# Patient Record
Sex: MALE | Race: OTHER | Employment: FULL TIME | URBAN - METROPOLITAN AREA
[De-identification: names, ages, dates, MRNs, and addresses within clinical notes are randomized per-mention and may not be internally consistent; named-entity substitution may affect disease eponyms.]

---

## 2018-08-02 PROBLEM — G43.409 HEMIPLEGIC MIGRAINE: Status: ACTIVE | Noted: 2018-08-02

## 2018-08-02 PROBLEM — G45.9 TIA (TRANSIENT ISCHEMIC ATTACK): Status: ACTIVE | Noted: 2018-08-02

## 2018-08-02 PROBLEM — Z72.0 TOBACCO ABUSE: Chronic | Status: ACTIVE | Noted: 2018-08-02

## 2018-08-02 PROBLEM — I10 ESSENTIAL HYPERTENSION: Status: ACTIVE | Noted: 2018-08-02

## 2018-08-07 ENCOUNTER — HOSPITAL ENCOUNTER (INPATIENT)
Age: 63
LOS: 8 days | Discharge: HOME | DRG: 038 | End: 2018-08-15
Attending: EMERGENCY MEDICINE | Admitting: SURGERY
Payer: COMMERCIAL

## 2018-08-07 ENCOUNTER — APPOINTMENT (OUTPATIENT)
Dept: CT IMAGING | Age: 63
DRG: 038 | End: 2018-08-07
Attending: EMERGENCY MEDICINE
Payer: COMMERCIAL

## 2018-08-07 DIAGNOSIS — G56.03 BILATERAL CARPAL TUNNEL SYNDROME: ICD-10-CM

## 2018-08-07 DIAGNOSIS — I65.23 BILATERAL CAROTID ARTERY STENOSIS: ICD-10-CM

## 2018-08-07 DIAGNOSIS — G45.1 TRANSIENT ISCHEMIC ATTACK INVOLVING RIGHT INTERNAL CAROTID ARTERY: ICD-10-CM

## 2018-08-07 DIAGNOSIS — I63.239 CAROTID ARTERY STENOSIS WITH CEREBRAL INFARCTION (HCC): ICD-10-CM

## 2018-08-07 DIAGNOSIS — G45.9 TRANSIENT CEREBRAL ISCHEMIA, UNSPECIFIED TYPE: ICD-10-CM

## 2018-08-07 DIAGNOSIS — I63.9 CEREBROVASCULAR ACCIDENT (CVA), UNSPECIFIED MECHANISM (HCC): ICD-10-CM

## 2018-08-07 LAB
ALBUMIN SERPL-MCNC: 3.8 G/DL (ref 3.5–5)
ALBUMIN/GLOB SERPL: 0.9 {RATIO} (ref 1.1–2.2)
ALP SERPL-CCNC: 86 U/L (ref 45–117)
ALT SERPL-CCNC: 33 U/L (ref 12–78)
ANION GAP SERPL CALC-SCNC: 8 MMOL/L (ref 5–15)
APPEARANCE UR: CLEAR
AST SERPL-CCNC: 16 U/L (ref 15–37)
ATRIAL RATE: 58 BPM
BASOPHILS # BLD: 0.1 K/UL (ref 0–0.1)
BASOPHILS NFR BLD: 1 % (ref 0–1)
BILIRUB SERPL-MCNC: 1 MG/DL (ref 0.2–1)
BILIRUB UR QL: NEGATIVE
BUN SERPL-MCNC: 14 MG/DL (ref 6–20)
BUN/CREAT SERPL: 14 (ref 12–20)
CALCIUM SERPL-MCNC: 8.7 MG/DL (ref 8.5–10.1)
CALCULATED P AXIS, ECG09: 8 DEGREES
CALCULATED R AXIS, ECG10: -10 DEGREES
CALCULATED T AXIS, ECG11: 26 DEGREES
CHLORIDE SERPL-SCNC: 103 MMOL/L (ref 97–108)
CK MB CFR SERPL CALC: NORMAL % (ref 0–2.5)
CK MB SERPL-MCNC: <1 NG/ML (ref 5–25)
CK SERPL-CCNC: 60 U/L (ref 39–308)
CO2 SERPL-SCNC: 28 MMOL/L (ref 21–32)
COLOR UR: NORMAL
CREAT SERPL-MCNC: 0.99 MG/DL (ref 0.7–1.3)
DIAGNOSIS, 93000: NORMAL
DIFFERENTIAL METHOD BLD: ABNORMAL
EOSINOPHIL # BLD: 0.5 K/UL (ref 0–0.4)
EOSINOPHIL NFR BLD: 5 % (ref 0–7)
ERYTHROCYTE [DISTWIDTH] IN BLOOD BY AUTOMATED COUNT: 12.1 % (ref 11.5–14.5)
GLOBULIN SER CALC-MCNC: 4.2 G/DL (ref 2–4)
GLUCOSE BLD STRIP.AUTO-MCNC: 167 MG/DL (ref 65–100)
GLUCOSE BLD STRIP.AUTO-MCNC: 99 MG/DL (ref 65–100)
GLUCOSE SERPL-MCNC: 88 MG/DL (ref 65–100)
GLUCOSE UR STRIP.AUTO-MCNC: NEGATIVE MG/DL
HCT VFR BLD AUTO: 42.8 % (ref 36.6–50.3)
HGB BLD-MCNC: 14.6 G/DL (ref 12.1–17)
HGB UR QL STRIP: NEGATIVE
IMM GRANULOCYTES # BLD: 0 K/UL (ref 0–0.04)
IMM GRANULOCYTES NFR BLD AUTO: 0 % (ref 0–0.5)
INR BLD: 1 (ref 0.9–1.2)
INR PPP: 1 (ref 0.9–1.1)
KETONES UR QL STRIP.AUTO: NEGATIVE MG/DL
LEUKOCYTE ESTERASE UR QL STRIP.AUTO: NEGATIVE
LYMPHOCYTES # BLD: 2.1 K/UL (ref 0.8–3.5)
LYMPHOCYTES NFR BLD: 22 % (ref 12–49)
MAGNESIUM SERPL-MCNC: 2.1 MG/DL (ref 1.6–2.4)
MCH RBC QN AUTO: 31 PG (ref 26–34)
MCHC RBC AUTO-ENTMCNC: 34.1 G/DL (ref 30–36.5)
MCV RBC AUTO: 90.9 FL (ref 80–99)
MONOCYTES # BLD: 0.6 K/UL (ref 0–1)
MONOCYTES NFR BLD: 7 % (ref 5–13)
NEUTS SEG # BLD: 6.5 K/UL (ref 1.8–8)
NEUTS SEG NFR BLD: 66 % (ref 32–75)
NITRITE UR QL STRIP.AUTO: NEGATIVE
NRBC # BLD: 0 K/UL (ref 0–0.01)
NRBC BLD-RTO: 0 PER 100 WBC
P-R INTERVAL, ECG05: 170 MS
PH UR STRIP: 7 [PH] (ref 5–8)
PLATELET # BLD AUTO: 286 K/UL (ref 150–400)
PMV BLD AUTO: 10 FL (ref 8.9–12.9)
POTASSIUM SERPL-SCNC: 4.3 MMOL/L (ref 3.5–5.1)
PROT SERPL-MCNC: 8 G/DL (ref 6.4–8.2)
PROT UR STRIP-MCNC: NEGATIVE MG/DL
PROTHROMBIN TIME: 10 SEC (ref 9–11.1)
Q-T INTERVAL, ECG07: 392 MS
QRS DURATION, ECG06: 88 MS
QTC CALCULATION (BEZET), ECG08: 384 MS
RBC # BLD AUTO: 4.71 M/UL (ref 4.1–5.7)
SERVICE CMNT-IMP: ABNORMAL
SERVICE CMNT-IMP: NORMAL
SODIUM SERPL-SCNC: 139 MMOL/L (ref 136–145)
SP GR UR REFRACTOMETRY: 1.02 (ref 1–1.03)
TROPONIN I SERPL-MCNC: <0.05 NG/ML
UROBILINOGEN UR QL STRIP.AUTO: 1 EU/DL (ref 0.2–1)
VENTRICULAR RATE, ECG03: 58 BPM
WBC # BLD AUTO: 9.8 K/UL (ref 4.1–11.1)

## 2018-08-07 PROCEDURE — 81003 URINALYSIS AUTO W/O SCOPE: CPT | Performed by: EMERGENCY MEDICINE

## 2018-08-07 PROCEDURE — 83735 ASSAY OF MAGNESIUM: CPT | Performed by: EMERGENCY MEDICINE

## 2018-08-07 PROCEDURE — 74011250637 HC RX REV CODE- 250/637: Performed by: HOSPITALIST

## 2018-08-07 PROCEDURE — 74011250636 HC RX REV CODE- 250/636: Performed by: HOSPITALIST

## 2018-08-07 PROCEDURE — 85610 PROTHROMBIN TIME: CPT

## 2018-08-07 PROCEDURE — 99285 EMERGENCY DEPT VISIT HI MDM: CPT

## 2018-08-07 PROCEDURE — 80053 COMPREHEN METABOLIC PANEL: CPT | Performed by: EMERGENCY MEDICINE

## 2018-08-07 PROCEDURE — 93005 ELECTROCARDIOGRAM TRACING: CPT

## 2018-08-07 PROCEDURE — 84484 ASSAY OF TROPONIN QUANT: CPT | Performed by: EMERGENCY MEDICINE

## 2018-08-07 PROCEDURE — 36415 COLL VENOUS BLD VENIPUNCTURE: CPT | Performed by: EMERGENCY MEDICINE

## 2018-08-07 PROCEDURE — 74011636637 HC RX REV CODE- 636/637: Performed by: HOSPITALIST

## 2018-08-07 PROCEDURE — 82962 GLUCOSE BLOOD TEST: CPT

## 2018-08-07 PROCEDURE — 65660000000 HC RM CCU STEPDOWN

## 2018-08-07 PROCEDURE — 82550 ASSAY OF CK (CPK): CPT | Performed by: EMERGENCY MEDICINE

## 2018-08-07 PROCEDURE — 70450 CT HEAD/BRAIN W/O DYE: CPT

## 2018-08-07 PROCEDURE — 85610 PROTHROMBIN TIME: CPT | Performed by: EMERGENCY MEDICINE

## 2018-08-07 PROCEDURE — 85025 COMPLETE CBC W/AUTO DIFF WBC: CPT | Performed by: EMERGENCY MEDICINE

## 2018-08-07 RX ORDER — LABETALOL HYDROCHLORIDE 5 MG/ML
5 INJECTION, SOLUTION INTRAVENOUS
Status: DISCONTINUED | OUTPATIENT
Start: 2018-08-07 | End: 2018-08-07

## 2018-08-07 RX ORDER — SODIUM CHLORIDE 0.9 % (FLUSH) 0.9 %
5-10 SYRINGE (ML) INJECTION EVERY 8 HOURS
Status: DISCONTINUED | OUTPATIENT
Start: 2018-08-07 | End: 2018-08-11 | Stop reason: SDUPTHER

## 2018-08-07 RX ORDER — METOPROLOL TARTRATE 25 MG/1
12.5 TABLET, FILM COATED ORAL 2 TIMES DAILY
Status: DISCONTINUED | OUTPATIENT
Start: 2018-08-08 | End: 2018-08-10

## 2018-08-07 RX ORDER — ACETAMINOPHEN 650 MG/1
650 SUPPOSITORY RECTAL
Status: DISCONTINUED | OUTPATIENT
Start: 2018-08-07 | End: 2018-08-10

## 2018-08-07 RX ORDER — DIPHENHYDRAMINE HYDROCHLORIDE 50 MG/ML
50 INJECTION, SOLUTION INTRAMUSCULAR; INTRAVENOUS ONCE
Status: COMPLETED | OUTPATIENT
Start: 2018-08-07 | End: 2018-08-07

## 2018-08-07 RX ORDER — MONTELUKAST SODIUM 4 MG/1
2 TABLET, CHEWABLE ORAL DAILY
Status: DISCONTINUED | OUTPATIENT
Start: 2018-08-08 | End: 2018-08-15 | Stop reason: HOSPADM

## 2018-08-07 RX ORDER — CLOPIDOGREL BISULFATE 75 MG/1
75 TABLET ORAL DAILY
Status: DISCONTINUED | OUTPATIENT
Start: 2018-08-07 | End: 2018-08-07

## 2018-08-07 RX ORDER — ONDANSETRON 2 MG/ML
4 INJECTION INTRAMUSCULAR; INTRAVENOUS
Status: DISCONTINUED | OUTPATIENT
Start: 2018-08-07 | End: 2018-08-11 | Stop reason: SDUPTHER

## 2018-08-07 RX ORDER — HYDRALAZINE HYDROCHLORIDE 20 MG/ML
10 INJECTION INTRAMUSCULAR; INTRAVENOUS
Status: DISCONTINUED | OUTPATIENT
Start: 2018-08-07 | End: 2018-08-10

## 2018-08-07 RX ORDER — ATORVASTATIN CALCIUM 40 MG/1
40 TABLET, FILM COATED ORAL
Status: DISCONTINUED | OUTPATIENT
Start: 2018-08-07 | End: 2018-08-10

## 2018-08-07 RX ORDER — DOCUSATE SODIUM 100 MG/1
100 CAPSULE, LIQUID FILLED ORAL 2 TIMES DAILY
Status: DISCONTINUED | OUTPATIENT
Start: 2018-08-07 | End: 2018-08-15 | Stop reason: HOSPADM

## 2018-08-07 RX ORDER — LORAZEPAM 2 MG/ML
1 INJECTION INTRAMUSCULAR AS NEEDED
Status: DISCONTINUED | OUTPATIENT
Start: 2018-08-07 | End: 2018-08-10

## 2018-08-07 RX ORDER — ASPIRIN 81 MG/1
81 TABLET ORAL DAILY
Status: DISCONTINUED | OUTPATIENT
Start: 2018-08-08 | End: 2018-08-10 | Stop reason: SDUPTHER

## 2018-08-07 RX ORDER — SODIUM CHLORIDE 0.9 % (FLUSH) 0.9 %
5-10 SYRINGE (ML) INJECTION AS NEEDED
Status: DISCONTINUED | OUTPATIENT
Start: 2018-08-07 | End: 2018-08-11 | Stop reason: SDUPTHER

## 2018-08-07 RX ORDER — ACETAMINOPHEN 325 MG/1
650 TABLET ORAL
Status: DISCONTINUED | OUTPATIENT
Start: 2018-08-07 | End: 2018-08-11 | Stop reason: SDUPTHER

## 2018-08-07 RX ADMIN — DOCUSATE SODIUM 100 MG: 100 CAPSULE, LIQUID FILLED ORAL at 18:47

## 2018-08-07 RX ADMIN — Medication 10 ML: at 22:43

## 2018-08-07 RX ADMIN — PREDNISONE 50 MG: 5 TABLET ORAL at 18:47

## 2018-08-07 RX ADMIN — CLOPIDOGREL BISULFATE 75 MG: 75 TABLET ORAL at 15:00

## 2018-08-07 RX ADMIN — ATORVASTATIN CALCIUM 40 MG: 40 TABLET, FILM COATED ORAL at 22:43

## 2018-08-07 RX ADMIN — DIPHENHYDRAMINE HYDROCHLORIDE 50 MG: 50 INJECTION, SOLUTION INTRAMUSCULAR; INTRAVENOUS at 15:00

## 2018-08-07 NOTE — CONSULTS
Dictated    Consult  REFERRED BY:  None    CHIEF COMPLAINT: Numbness of both hands      Subjective:     Ney Velasco is a 57 y.o. right-handed male we are asked to evaluate as a new patient to us, at the request of Dr. Pablo Siegel of a new problem of sudden numbness of both hands on waking this morning where she was referred here because he has known carotid stenosis after recent TIA that occurred about 3 or 4 days ago when he was admitted to [facility] for sudden left-sided weakness, and was found to have bilateral 80-90% carotid stenosis at the bifurcation, and MRI scan showed areas of microinfarction scattered throughout the right hemisphere indicating possible embolic disease probably from his carotids. The patient was sent to a neurosurgeon as an outpatient, but then had a spell today and came to the hospital.  The numbness in his hand was bilateral and equal today. He feels it is better now, but he does have bilateral Tinel's over his median nerves, and seems to have bilateral carpal tunnel syndromes which might account for his numbness today. I would to be on the safe side we will go ahead and get another MRI scan while we proceed with CTA in the a.m. to evaluate his carotid stenosis once he has been on prednisone and premedicated for possible allergy to iodine. Patient denies any prior history of any TIAs or strokes in the past.  Patient denies any chest pain or palpitations or cardiac arrhythmias. Patient denies any headache, fever, meningismus, or other causes for this event. 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 Prior to his recent TIA several days ago he apparently did not have much of the medical history. He currently been on a baby aspirin since his TIA.   We will continue this and he might be a surgical candidate in the near future, I will hold on the Plavix for now but if we need to wait for surgery, he would be a good candidate for aspirin and Plavix.   Reviewed his records, discussed his case with the patient and his girlfriend and reviewed the records on the PACS system, and I agree with plans as ordered by the hospitalist.      Past Medical History:   Diagnosis Date    Bilateral carotid artery stenosis     TIA (transient ischemic attack)       Past Surgical History:   Procedure Laterality Date    HX CHOLECYSTECTOMY       Family History   Problem Relation Age of Onset    Alzheimer Mother     Heart Disease Father     Diabetes Brother       Social History   Substance Use Topics    Smoking status: Never Smoker    Smokeless tobacco: Current User    Alcohol use Yes         Current Facility-Administered Medications:     sodium chloride (NS) flush 5-10 mL, 5-10 mL, IntraVENous, Q8H, Celina Aldana MD    sodium chloride (NS) flush 5-10 mL, 5-10 mL, IntraVENous, PRN, Celina Aldana MD    acetaminophen (TYLENOL) tablet 650 mg, 650 mg, Oral, Q4H PRN **OR** acetaminophen (TYLENOL) solution 650 mg, 650 mg, Per NG tube, Q4H PRN **OR** acetaminophen (TYLENOL) suppository 650 mg, 650 mg, Rectal, Q4H PRN, Celina Aldana MD    ondansetron [facility]) injection 4 mg, 4 mg, IntraVENous, Q6H PRN, Celina Aldana MD    clopidogrel (PLAVIX) tablet 75 mg, 75 mg, Oral, DAILY, Celina Aldana MD, 75 mg at 08/07/18 1500    [START ON 8/8/2018] aspirin delayed-release tablet 81 mg, 81 mg, Oral, DAILY, Celina Aldana MD    labetalol (NORMODYNE;TRANDATE) injection 5 mg, 5 mg, IntraVENous, Q10MIN PRN, Celina Aldana MD    docusate sodium (COLACE) capsule 100 mg, 100 mg, Oral, BID, Celina Aldana MD    LORazepam (ATIVAN) injection 1 mg, 1 mg, IntraVENous, PRN, Celina Aldana MD    predniSONE (DELTASONE) tablet 50 mg, 50 mg, Oral, ONCE, MD Caty Tenorio.Jon Umanzor ON 8/8/2018] predniSONE (DELTASONE) tablet 50 mg, 50 mg, Oral, ONCE, MD Caty Tenorio.Jon Umanzor ON 8/8/2018] predniSONE (DELTASONE) tablet 50 mg, 50 mg, Oral, ONCE, Celina Aldana MD    atorvastatin (LIPITOR) tablet 40 mg, 40 mg, Oral, QHS, Marin Cuellar MD    [START ON 8/8/2018] colestipol (COLESTID) tablet 2 g, 2 g, Oral, DAILY, Marin Cuellar MD    Current Outpatient Prescriptions:     aspirin 81 mg chewable tablet, Take 1 Tab by mouth daily. , Disp: 30 Tab, Rfl: 0    atorvastatin (LIPITOR) 40 mg tablet, Take 1 Tab by mouth nightly., Disp: 30 Tab, Rfl: 0    lisinopril (PRINIVIL, ZESTRIL) 5 mg tablet, Take 1 Tab by mouth daily. , Disp: 30 Tab, Rfl: 0    metoprolol tartrate (LOPRESSOR) 25 mg tablet, Take 0.5 Tabs by mouth two (2) times a day., Disp: 30 Tab, Rfl: 0    colestipol (COLESTID) 1 gram tablet, Take 2 Tabs by mouth daily. , Disp: 30 Tab, Rfl: 0        Allergies   Allergen Reactions    Iodine Hives        Review of Systems:  A comprehensive review of systems was negative except for: Musculoskeletal: positive for myalgias, arthralgias and stiff joints  Neurological: positive for paresthesia and weakness   Vitals:    08/07/18 1500 08/07/18 1530 08/07/18 1600 08/07/18 1700   BP: 148/55 130/59 157/67 150/71   Pulse:    65   Resp:    15   Temp:    98.3 °F (36.8 °C)   SpO2: 96% 98% 98% 98%   Weight:       Height:         Objective:     I      NEUROLOGICAL EXAM:    Appearance: The patient is well developed, well nourished, provides a coherent history and is in no acute distress. Mental Status: Oriented to time, place and person, and the president, cognitive function is normal and speech is fluent and no aphasia or dysarthria. Mood and affect appropriate. Cranial Nerves:   Intact visual fields. Fundi are benign. DIAN, EOM's full, no nystagmus, no ptosis. Facial sensation is normal. Corneal reflexes are not tested. Facial movement is symmetric. Hearing is normal bilaterally. Palate is midline with normal sternocleidomastoid and trapezius muscles are normal. Tongue is midline.   Neck without meningismus or bruits  Temporal arteries are not tender or enlarged   Motor:  5/5 strength in upper and lower proximal and distal muscles. Normal bulk and tone. No fasciculations. Reflexes:   Deep tendon reflexes 2+/4 and symmetrical.  No babinski or clonus present  He has Tinel's over both median nerves at the wrists   Sensory:   Normal to touch, pinprick and vibration. DSS is intact   Gait:  Normal gait. Tremor:   No tremor noted. Cerebellar:  No cerebellar signs present. Neurovascular:  Normal heart sounds and regular rhythm, peripheral pulses decreased, and no carotid bruits. Assessment:   [unfilled]  Active Problems:    Stroke (cerebrum) (HCC) (8/7/2018)      Bilateral carpal tunnel syndrome (8/7/2018)      Transient ischemic attack involving right internal carotid artery (8/7/2018)      Bilateral carotid artery stenosis (8/7/2018)        Plan:     Patient with known bilateral carotid stenosis in the range of 80-90%, with possible recurrent TIAs versus carpal tunnel syndrome today, and clear TIA 3-4 days ago, and patient needs surgical consultation to consider endarterectomy. Continue current therapy, and workup as ordered, and I will follow carefully with you. We will consult vascular surgery in a.m. once we have his CTA back if it does indeed show high-grade stenosis  He eventually will probably need an EMG and nerve conduction study of both hands and we probably need to screen him with cardiac evaluation just to make sure there is no cardiac source of these distal embolic events in his right hemisphere.     Signed By: Leilani Elliott MD     August 7, 2018       CC: None  FAX: None

## 2018-08-07 NOTE — ROUTINE PROCESS

## 2018-08-07 NOTE — PROGRESS NOTES
Speech path   Consult received to evaluate this pt per stroke protocol. Met with pt and his wife. Pt is communicating intelligibly and fluently. No communication needs noted. Wife reports his thinking and memory are at baseline. We will sign off.   Rika Torres, SLP

## 2018-08-07 NOTE — IP AVS SNAPSHOT
[address] [facility] 
[phone] Patient: Braxton Gomez MRN: [id number] :1955 A check nnamdi indicates which time of day the medication should be taken. My Medications START taking these medications Instructions Each Dose to Equal  
 Morning Noon Evening Bedtime HYDROcodone-acetaminophen  mg tablet Commonly known as:  Ang Singh Your last dose was: Your next dose is: Take 1 Tab by mouth every six (6) hours as needed. Max Daily Amount: 4 Tabs. 1 Tab CONTINUE taking these medications Instructions Each Dose to Equal  
 Morning Noon Evening Bedtime  
 aspirin 81 mg chewable tablet Your last dose was: Your next dose is: Take 1 Tab by mouth daily. 81 mg  
    
   
   
   
  
 atorvastatin 40 mg tablet Commonly known as:  LIPITOR Your last dose was: Your next dose is: Take 1 Tab by mouth nightly. 40 mg  
    
   
   
   
  
 colestipol 1 gram tablet Commonly known as:  COLESTID Your last dose was: Your next dose is: Take 2 Tabs by mouth daily. 2 g  
    
   
   
   
  
 lisinopril 5 mg tablet Commonly known as:  Bárbara Cleaning Your last dose was: Your next dose is: Take 1 Tab by mouth daily. 5 mg  
    
   
   
   
  
 metoprolol tartrate 25 mg tablet Commonly known as:  LOPRESSOR Your last dose was: Your next dose is: Take 0.5 Tabs by mouth two (2) times a day. 12.5 mg Where to Get Your Medications Information on where to get these meds will be given to you by the nurse or doctor. ! Ask your nurse or doctor about these medications HYDROcodone-acetaminophen  mg tablet

## 2018-08-07 NOTE — ED PROVIDER NOTES
EMERGENCY DEPARTMENT HISTORY AND PHYSICAL EXAM      Date: 8/7/2018  Patient Name: Felipa Gregory    History of Presenting Illness     Chief Complaint   Patient presents with    Numbness     per pt admitted thursday to r/o stroke and now has numbness bilateral hands       History Provided By: Patient and Records    HPI: Felipa Gregory, 57 y.o. male with hx of TIA, presents ambulatory to the ED with cc of constant, 3/10 right sided HA, mild lightheadedness, and B/L hand numbness since ~1600 yesterday (8/6/18). He states numbness occasionally radiates to his forearms, and also c/o mild lightheadedness today. Per pt records, pt was seen at [facility] ED (08/02/18) for left facial droop, and expressive speech change that resolved ~30 minutes after arrival to ED. He was admitted to [facility] for further work-up, and was diagnosed with TIA prior to discharge. Pt reports he had onset of B/L hand numbness ~1 day after being admitted to [facility], was kept for an additional day due to onset of his symptoms, and instructed to return to ED if symptoms returned. He has follow up appointment with Vascular Surgery on (8/8/18) for evaluation of 80-99% stenosis to B/L carotids found on US during admission. Pt currently takes ASA 81 mg daily, but otherwise denies currently taking anticoagulants, and denies taking any medications for his pain. Pt denies recent trauma to which his symptoms might be attributed. He denies vision changes, facial numbness, lower extremity numbness, CP, nausea, vomiting, SOB, fever, chills, or focal weakness. (-) Smoke, (+) EtOH, (-) Illicit drugs    PFMHx: MI (father)    Chief Complaint: ha, lightheadedness, numbness  Duration: 1 Days  Timing:  Constant  Location: right sided  Quality: Aching  Severity: 3 out of 10  Modifying Factors: N/A  Associated Symptoms: N/A    There are no other complaints, changes, or physical findings at this time.     PCP: None    Current Outpatient Prescriptions   Medication Sig Dispense Refill    aspirin 81 mg chewable tablet Take 1 Tab by mouth daily. 30 Tab 0    atorvastatin (LIPITOR) 40 mg tablet Take 1 Tab by mouth nightly. 30 Tab 0    lisinopril (PRINIVIL, ZESTRIL) 5 mg tablet Take 1 Tab by mouth daily. 30 Tab 0    metoprolol tartrate (LOPRESSOR) 25 mg tablet Take 0.5 Tabs by mouth two (2) times a day. 30 Tab 0    colestipol (COLESTID) 1 gram tablet Take 2 Tabs by mouth daily. 30 Tab 0       Past History     Past Medical History:  History reviewed. No pertinent past medical history. Past Surgical History:  Past Surgical History:   Procedure Laterality Date    HX CHOLECYSTECTOMY         Family History:  History reviewed. No pertinent family history. Social History:  Social History   Substance Use Topics    Smoking status: Never Smoker    Smokeless tobacco: Current User    Alcohol use Yes       Allergies: Allergies   Allergen Reactions    Iodine Not Reported This Time         Review of Systems   Review of Systems   Constitutional: Negative for chills and fever. HENT: Negative for congestion. Eyes: Negative for visual disturbance. Respiratory: Negative for shortness of breath. Cardiovascular: Negative for chest pain. Gastrointestinal: Negative for nausea and vomiting. Endocrine: Negative for heat intolerance. Genitourinary: Negative. Musculoskeletal: Negative for back pain. Skin: Negative for rash. Allergic/Immunologic: Negative for immunocompromised state. Neurological: Positive for light-headedness, numbness (B/L hands) and headaches (right). Negative for weakness (Focal). -lower extremity numbness   Hematological: Does not bruise/bleed easily. Psychiatric/Behavioral: Negative. All other systems reviewed and are negative. Physical Exam   Physical Exam   Constitutional: He is oriented to person, place, and time. He appears well-developed and well-nourished. No distress.    Elevated BMI   HENT:   Head: Normocephalic and atraumatic. Eyes: EOM are normal. Pupils are equal, round, and reactive to light. Neck: Normal range of motion. Neck supple. Cardiovascular: Normal rate, regular rhythm and normal heart sounds. Pulmonary/Chest: Effort normal and breath sounds normal. He has no wheezes. Abdominal: Soft. Bowel sounds are normal. There is no tenderness. Musculoskeletal: Normal range of motion. He exhibits no edema or tenderness. Pulse, movement, and sensation intact   Neurological: He is alert and oriented to person, place, and time. No cranial nerve deficit. Sensory/motor intact   Skin: Skin is warm and dry. Psychiatric: He has a normal mood and affect. His behavior is normal.   Nursing note and vitals reviewed. Diagnostic Study Results     Labs -     Recent Results (from the past 12 hour(s))   CBC WITH AUTOMATED DIFF    Collection Time: 08/07/18 11:20 AM   Result Value Ref Range    WBC 9.8 4.1 - 11.1 K/uL    RBC 4.71 4.10 - 5.70 M/uL    HGB 14.6 12.1 - 17.0 g/dL    HCT 42.8 36.6 - 50.3 %    MCV 90.9 80.0 - 99.0 FL    MCH 31.0 26.0 - 34.0 PG    MCHC 34.1 30.0 - 36.5 g/dL    RDW 12.1 11.5 - 14.5 %    PLATELET 854 964 - 489 K/uL    MPV 10.0 8.9 - 12.9 FL    NRBC 0.0 0  WBC    ABSOLUTE NRBC 0.00 0.00 - 0.01 K/uL    NEUTROPHILS 66 32 - 75 %    LYMPHOCYTES 22 12 - 49 %    MONOCYTES 7 5 - 13 %    EOSINOPHILS 5 0 - 7 %    BASOPHILS 1 0 - 1 %    IMMATURE GRANULOCYTES 0 0.0 - 0.5 %    ABS. NEUTROPHILS 6.5 1.8 - 8.0 K/UL    ABS. LYMPHOCYTES 2.1 0.8 - 3.5 K/UL    ABS. MONOCYTES 0.6 0.0 - 1.0 K/UL    ABS. EOSINOPHILS 0.5 (H) 0.0 - 0.4 K/UL    ABS. BASOPHILS 0.1 0.0 - 0.1 K/UL    ABS. IMM.  GRANS. 0.0 0.00 - 0.04 K/UL    DF AUTOMATED     PROTHROMBIN TIME + INR    Collection Time: 08/07/18 11:20 AM   Result Value Ref Range    INR 1.0 0.9 - 1.1      Prothrombin time 10.0 9.0 - 28.5 sec   METABOLIC PANEL, COMPREHENSIVE    Collection Time: 08/07/18 11:20 AM   Result Value Ref Range    Sodium 139 136 - 145 mmol/L Potassium 4.3 3.5 - 5.1 mmol/L    Chloride 103 97 - 108 mmol/L    CO2 28 21 - 32 mmol/L    Anion gap 8 5 - 15 mmol/L    Glucose 88 65 - 100 mg/dL    BUN 14 6 - 20 MG/DL    Creatinine 0.99 0.70 - 1.30 MG/DL    BUN/Creatinine ratio 14 12 - 20      GFR est AA >60 >60 ml/min/1.73m2    GFR est non-AA >60 >60 ml/min/1.73m2    Calcium 8.7 8.5 - 10.1 MG/DL    Bilirubin, total 1.0 0.2 - 1.0 MG/DL    ALT (SGPT) 33 12 - 78 U/L    AST (SGOT) 16 15 - 37 U/L    Alk.  phosphatase 86 45 - 117 U/L    Protein, total 8.0 6.4 - 8.2 g/dL    Albumin 3.8 3.5 - 5.0 g/dL    Globulin 4.2 (H) 2.0 - 4.0 g/dL    A-G Ratio 0.9 (L) 1.1 - 2.2     URINALYSIS W/ RFLX MICROSCOPIC    Collection Time: 08/07/18 11:20 AM   Result Value Ref Range    Color YELLOW/STRAW      Appearance CLEAR CLEAR      Specific gravity 1.016 1.003 - 1.030      pH (UA) 7.0 5.0 - 8.0      Protein NEGATIVE  NEG mg/dL    Glucose NEGATIVE  NEG mg/dL    Ketone NEGATIVE  NEG mg/dL    Bilirubin NEGATIVE  NEG      Blood NEGATIVE  NEG      Urobilinogen 1.0 0.2 - 1.0 EU/dL    Nitrites NEGATIVE  NEG      Leukocyte Esterase NEGATIVE  NEG     MAGNESIUM    Collection Time: 08/07/18 11:20 AM   Result Value Ref Range    Magnesium 2.1 1.6 - 2.4 mg/dL   CK W/ CKMB & INDEX    Collection Time: 08/07/18 11:20 AM   Result Value Ref Range    CK 60 39 - 308 U/L    CK - MB <1.0 <3.6 NG/ML    CK-MB Index Cannot be calculated 0 - 2.5     TROPONIN I    Collection Time: 08/07/18 11:20 AM   Result Value Ref Range    Troponin-I, Qt. <0.05 <0.05 ng/mL   EKG, 12 LEAD, INITIAL    Collection Time: 08/07/18 11:43 AM   Result Value Ref Range    Ventricular Rate 58 BPM    Atrial Rate 58 BPM    P-R Interval 170 ms    QRS Duration 88 ms    Q-T Interval 392 ms    QTC Calculation (Bezet) 384 ms    Calculated P Axis 8 degrees    Calculated R Axis -10 degrees    Calculated T Axis 26 degrees    Diagnosis       Sinus bradycardia  Inferior infarct , age undetermined  When compared with ECG of 02-AUG-2018 21:55,  No significant change was found     GLUCOSE, POC    Collection Time: 08/07/18 11:55 AM   Result Value Ref Range    Glucose (POC) 99 65 - 100 mg/dL    Performed by Cristina Liu (PCT)    POC INR    Collection Time: 08/07/18 11:56 AM   Result Value Ref Range    INR (POC) 1.0 <1.2         Radiologic Studies -   CT Results  (Last 48 hours)               08/07/18 1133  CT HEAD WO CONT Final result    Impression:  IMPRESSION: No acute process or change compared to the prior exam.               Narrative:  EXAM:  CT HEAD WO CONT       INDICATION:   Bilateral hand numbness since yesterday       COMPARISON: 8/2/2018. CONTRAST:  None. TECHNIQUE: Unenhanced CT of the head was performed using 5 mm images. Brain and   bone windows were generated. CT dose reduction was achieved through use of a   standardized protocol tailored for this examination and automatic exposure   control for dose modulation. FINDINGS:   The ventricles and sulci are normal in size, shape and configuration and   midline. There is no significant white matter disease. There is no intracranial   hemorrhage, extra-axial collection, mass, mass effect or midline shift. The   basilar cisterns are open. No acute infarct is identified. The bone windows   demonstrate no abnormalities. The visualized portions of the paranasal sinuses   and mastoid air cells are clear. Medical Decision Making   I am the first provider for this patient. I reviewed the vital signs, available nursing notes, past medical history, past surgical history, family history and social history. Vital Signs-Reviewed the patient's vital signs. Patient Vitals for the past 12 hrs:   Temp Pulse Resp BP SpO2   08/07/18 1059 98.3 °F (36.8 °C) (!) 58 16 143/63 98 %       Pulse Oximetry Analysis - 98 % on RA    Cardiac Monitor:   Rate: 58 bpm  Rhythm: Normal Sinus Rhythm     EKG interpretation: (Preliminary)  Rhythm: sinus bradycardia; and regular .  Rate (approx.): 58; Axis: normal; HI interval: normal; QRS interval: normal ; ST/T wave: normal; Other findings: inferior infarct, possible ischemia. Written by Louis Elias ED Scribe, as dictated by Viktoriya Espinoza MD.    Records Reviewed: Nursing Notes, Old Medical Records, Previous electrocardiograms, Previous Radiology Studies and Previous Laboratory Studies    Provider Notes (Medical Decision Making):   DDx: TIA, CVA, ICH, tension ha    ED Course:   Initial assessment performed. The patients presenting problems have been discussed, and they are in agreement with the care plan formulated and outlined with them. I have encouraged them to ask questions as they arise throughout their visit. CONSULT NOTE:   12:48 PM  Viktoriya Espinoza MD spoke with Radha Wheeler MD,   Specialty: Hospitalist  Discussed pt's hx, disposition, and available diagnostic and imaging results. Reviewed care plans. Consultant will evaluate pt for admission. She requests Neurology consult. Consult Note:  1:20 PM  Viktoriya Espinoza MD spoke with Juan Pablo Ferreira MD,  Specialty: Neurology  Discussed pt's hx, disposition, and available diagnostic and imaging results. Reviewed care plans. Consultant agrees with plans as outlined. Discussed pt with Dr. Sallie Jason, advises obtaining another MRI, and will plan for CTA to further evaluate if MRI is negative. Disposition:  ADMIT NOTE:  12:48 PM  The patient is being admitted to the hospital.  The results of their tests and reasons for their admission have been discussed with the patient and/or available family. They convey agreement and understanding for the need to be admitted and for their admission diagnosis. PLAN:  1. Admit to Hospitalist    Diagnosis     Clinical Impression:   1. Bilateral carotid artery stenosis    2. Bilateral carpal tunnel syndrome    3. Cerebrovascular accident (CVA), unspecified mechanism ([facility] [address]    4. Transient ischemic attack involving right internal carotid artery    5. Transient cerebral ischemia, unspecified type        Attestations: This note is prepared by Randolph Estrada, acting as Scribe for Tisha Nuñez MD.    Tisha Nuñez MD: The scribe's documentation has been prepared under my direction and personally reviewed by me in its entirety. I confirm that the note above accurately reflects all work, treatment, procedures, and medical decision making performed by me.

## 2018-08-07 NOTE — ED TRIAGE NOTES
Code S called from triage however after being evaluated by Dr. Kim Golden was found that symptoms began yesterday around 3-4 pm, was recently admitted to rule out TIA's

## 2018-08-07 NOTE — PROGRESS NOTES

## 2018-08-07 NOTE — PROGRESS NOTES
Spiritual Care Assessment/Progress Note  [facility]      NAME: Gina Montes      MRN: [id number]  AGE: 57 y.o. SEX: male  Druze Affiliation: [employer]   Language: English     8/7/2018     Total Time (in minutes): 7     Spiritual Assessment begun in \[facility]\"" EMERGENCY DEPT through conversation with:         [x]Patient        [] Family    [x] Friend(s)        Reason for Consult: Other (comment) (Code S)     Spiritual beliefs: (Please include comment if needed)     [x] Identifies with a linda tradition:         [] Supported by a linda community:            [] Claims no spiritual orientation:           [] Seeking spiritual identity:                [] Adheres to an individual form of spirituality:           [] Not able to assess:                           Identified resources for coping:      [] Prayer                               [] Music                  [] Guided Imagery     [] Family/friends                 [] Pet visits     [] Devotional reading                         [x] Unknown     [] Other:                                              Interventions offered during this visit: (See comments for more details)    Patient Interventions: Coping skills reviewed/reinforced, Initial/Spiritual assessment, patient floor, Crisis     Family/Friend(s): Coping skills reviewed/reinforced     Plan of Care:     [] Support spiritual and/or cultural needs    [] Support AMD and/or advance care planning process      [] Support grieving process   [] Coordinate Rites and/or Rituals    [] Coordination with community clergy   [] No spiritual needs identified at this time   [] Detailed Plan of Care below (See Comments)  [] Make referral to Music Therapy  [] Make referral to Pet Therapy     [] Make referral to Addiction services  [] Make referral to [facility]  [] Make referral to Spiritual Care Partner  [] No future visits requested        [x] Follow up visits as needed   Responded to Code S in ER18.  Staff informed that code was being downsized. Met with pt and his friend, both who are from [place] and working in the area for a month. Both appeared to be in positive spirits and self-reported to be coping well. Concluded visit once pt got up to use restroom. No specific needs expressed at this time. Extended blessing. Will follow up as needed. HAO Pérez. Julio César Marquez

## 2018-08-07 NOTE — H&P
Hospitalist Admission Note    NAME: Ivelisse Singh   :  1955   MRN:  [id number]     Date/Time:  2018 1:24 PM    Patient PCP: None local.  Patient is from [place] working in  [employer] for next month  ______________________________________________________________________   Assessment & Plan:  TIA vs recurrent stroke  Severe bilateral carotid stenoses, POA  Recent embolic stroke right corona radiata and right parietal lobe 8/3/18  --HA and b/l hand paresthesia, started at 3pm yesterday, slightly improving  --recently hospitalized  to 18 at [place] [address] with HA, unsteady gait, left facial droop, expressive aphasia/slurred speech, then b/l hand paresthesia. All symptoms resolved and found to have multiple small infarcts in right corona radiata and right parietal lobe c/w either embolic stroke or vasculitis. Carotid US with 80-99% b/l carotid stenoses. CTA head and neck to be done outpatient due to iodine allergy and to f/u neurology and vascular surgery  --d/w with neurology Dr. Maddie Villatoro. Will continue aspirin for now. Get MRI brain, MRA head and neck. --get CTA head and neck at 8am tomorrow after premedicate with prednisone 50mg at 7pm, 50mg at 1am, 50mg at 7am and benadryl 50mg IV at 7am tomorrow. If CTA head and neck with severe stenosis and new stroke on MRI, would start heparin drip. --also will need RYAN if CTA negative for vasculitis  --check A1c since not done last admission. HTN  --started on toprol and lisinopril last week. He has taken these meds today. Will hold lisinopril to allow permissive HTN.    --continue toprol    Hyperlipidemia  --continue statin started last week     Tobacco abuse  --chew snuff    Obesity  Body mass index is 34.62 kg/(m^2). Code:  full  DVT prophylaxis: SCD  Surrogate decision maker:  Has daughter Linwood Ball in [place].   Emergency contact friend Nevaeh Hinkle [phone]        Subjective:   CHIEF COMPLAINT:  HA, b/l hand numbness    HISTORY OF PRESENT ILLNESS:     Abhay Polk is a 57 y.o. 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 MRI showed embolic strokes in right corona radiata and right parietal vs. Possible vasculitis. Carotid US showed 80-99% b/l carotid stenoses. Also developed b/l hand numbness next day. Patient discharged on ASA, statin, new BP meds. Was told to get CTA head and neck outpatient and f/u with neurology and vascular surgeon but if developed any new or recurrent symptoms then to return to ER. Yesterday 3pm develop HA and b/l hand tingling severity 3/10 but has improved to 1-2/10 since arrival.  Denies any acute visual changes, focal weakness, CP, SOB. We were asked to admit for work up and evaluation of the above problems. Past Medical History:   Diagnosis Date    Bilateral carotid artery stenosis     880-99%    Stroke (cerebrum) ([facility] [address] 2018    multiple embolic stroke on right coronal radiata and right parietal      Past Surgical History:   Procedure Laterality Date    HX CHOLECYSTECTOMY       Social History   Substance Use Topics    Smoking status: Never Smoker    Smokeless tobacco: Current User    Alcohol use Yes      Drug use:        History reviewed. Family history:  Father  MI age 52. No FHx stroke  Allergies   Allergen Reactions    Iodine Hives        Prior to Admission medications    Medication Sig Start Date End Date Taking? Authorizing Provider   aspirin 81 mg chewable tablet Take 1 Tab by mouth daily. 18   Hollie Mingle., NP   atorvastatin (LIPITOR) 40 mg tablet Take 1 Tab by mouth nightly. 18   Hollie Mingle., NP   lisinopril (PRINIVIL, ZESTRIL) 5 mg tablet Take 1 Tab by mouth daily.  18   Hollie Chavezgle., NP   metoprolol tartrate (LOPRESSOR) 25 mg tablet Take 0.5 Tabs by mouth two (2) times a day. 18   Hollie Ontiverose., NP   colestipol (COLESTID) 1 gram tablet Take 2 Tabs by mouth daily. 18   Hollie Ontiverose., NP     REVIEW OF SYSTEMS:  POSITIVE= Bold. Negative = normal text  General:  fever, chills, sweats, generalized weakness, weight loss/gain, loss of appetite  Eyes:  blurred vision, eye pain, loss of vision, diplopia  Ear Nose and Throat:  rhinorrhea, pharyngitis  Respiratory:   cough, sputum production, SOB, wheezing, COATS, pleuritic pain  Cardiology:  chest pain, palpitations, orthopnea, PND, edema, syncope   Gastrointestinal:  abdominal pain, N/V, dysphagia, diarrhea, constipation, bleeding  Genitourinary:  frequency, urgency, dysuria, hematuria, incontinence  Muskuloskeletal :  arthralgia, myalgia  Hematology:  easy bruising, bleeding, lymphadenopathy  Dermatological:  rash, ulceration, pruritis  Endocrine:  hot flashes or polydipsia  Neurological:  headache, dizziness, confusion, focal weakness, paresthesia, memory loss, gait disturbance  Psychological: anxiety, depression, agitation      Objective:   VITALS:    Visit Vitals    /63 (BP 1 Location: Left arm)    Pulse (!) 58    Temp 98.3 °F (36.8 °C)    Resp 16    Ht 6' (1.829 m)    Wt 115.8 kg (255 lb 4.7 oz)    SpO2 98%    BMI 34.62 kg/m2     Temp (24hrs), Av.3 °F (36.8 °C), Min:98.3 °F (36.8 °C), Max:98.3 °F (36.8 °C)    Body mass index is 34.62 kg/(m^2). PHYSICAL EXAM:    General:    Alert, obese male, cooperative, no distress, appears stated age. HEENT: Atraumatic, anicteric sclerae, pink conjunctivae     No oral ulcers, mucosa moist, throat clear. Hearing intact. Neck:  Supple, symmetrical,  thyroid: non tender. Soft left carotid bruit. Lungs:   Clear to auscultation bilaterally. No Wheezing or Rhonchi. No rales. Chest wall:  No tenderness  No Accessory muscle use. Heart:   Regular  rhythm,  No  murmur   No gallop. No edema.     Abdomen:   Soft, non-tender. Not distended. Bowel sounds normal. No masses  Extremities: No cyanosis. No clubbing  Skin:     Not pale Not Jaundiced  No rashes   Psych:  Good insight. Not depressed. Not anxious or agitated. Neurologic: EOMs intact. No facial asymmetry. No aphasia or slurred speech. Symmetrical strength although right foot may be slightly weaker than left, Alert and oriented X 3. IMAGING RESULTS:   []       I have personally reviewed the actual   []     CXR  []     CT scan  CXR:  CT :  EKG:  Sinus erlin HR 58, age indeterminate inferior infarct with   MRI brain 8/3/18:  4 x 4 and 8 x 4 mm foci of restricted diffusion in the white matter of the right  parietal lobe. 3 x 4 mm area of restricted diffusion in the right corona  radiata. Multiple additional less than 3 mm foci of restricted diffusion also  seen in the right corona radiata. Embolic or vasculitis etiology cannot be excluded. 2. Mild cerebral atrophy and mild changes of chronic small vessel ischemia. Carotid US 8/3/18    FINDINGS:   Grayscale images reveal severe mixed atherosclerotic plaque within both internal  carotid arteries. Severe spectral broadening noted bilaterally.   The following peak systolic velocities were measured by Doppler ultrasound, in  cm/sec:   Right CCA:  65.  Right ICA:  470. Right ICA/CCA ratio:  7.2.   Left CCA:  86.  Left ICA:  310. Left ICA/CCA ratio:  3.6.   Vertebral artery flow is antegrade bilaterally.     IMPRESSION  IMPRESSION:  1.  80-99% stenosis in the right ICA. 2.  80-99% stenosis in the left ICA. 3.  Bilateral antegrade vertebral artery flow.    ________________________________________________________________________  Care Plan discussed with:    Comments   Patient y    Family  y Friend Roxana Gusman RN     Care Manager                    Consultant:  suzette Zazueta   ________________________________________________________________________  Prophylaxis:  GI none   DVT SCD ________________________________________________________________________  Recommended Disposition:   Home with Family y   HH/PT/OT/RN    SNF/LTC    MASHA    ________________________________________________________________________  Code Status:  Full Code y   DNR/DNI    ________________________________________________________________________  TOTAL TIME:  65 minutes      Comments     Reviewed previous records   >50% of visit spent in counseling and coordination of care  Discussion with patient and/or family and questions answered         ______________________________________________________________________  Savannah Steiner MD      Procedures: see electronic medical records for all procedures/Xrays and details which were not copied into this note but were reviewed prior to creation of Plan. LAB DATA REVIEWED:    Recent Results (from the past 24 hour(s))   CBC WITH AUTOMATED DIFF    Collection Time: 08/07/18 11:20 AM   Result Value Ref Range    WBC 9.8 4.1 - 11.1 K/uL    RBC 4.71 4.10 - 5.70 M/uL    HGB 14.6 12.1 - 17.0 g/dL    HCT 42.8 36.6 - 50.3 %    MCV 90.9 80.0 - 99.0 FL    MCH 31.0 26.0 - 34.0 PG    MCHC 34.1 30.0 - 36.5 g/dL    RDW 12.1 11.5 - 14.5 %    PLATELET 551 258 - 977 K/uL    MPV 10.0 8.9 - 12.9 FL    NRBC 0.0 0  WBC    ABSOLUTE NRBC 0.00 0.00 - 0.01 K/uL    NEUTROPHILS 66 32 - 75 %    LYMPHOCYTES 22 12 - 49 %    MONOCYTES 7 5 - 13 %    EOSINOPHILS 5 0 - 7 %    BASOPHILS 1 0 - 1 %    IMMATURE GRANULOCYTES 0 0.0 - 0.5 %    ABS. NEUTROPHILS 6.5 1.8 - 8.0 K/UL    ABS. LYMPHOCYTES 2.1 0.8 - 3.5 K/UL    ABS. MONOCYTES 0.6 0.0 - 1.0 K/UL    ABS. EOSINOPHILS 0.5 (H) 0.0 - 0.4 K/UL    ABS. BASOPHILS 0.1 0.0 - 0.1 K/UL    ABS. IMM.  GRANS. 0.0 0.00 - 0.04 K/UL    DF AUTOMATED     PROTHROMBIN TIME + INR    Collection Time: 08/07/18 11:20 AM   Result Value Ref Range    INR 1.0 0.9 - 1.1      Prothrombin time 10.0 9.0 - 32.2 sec   METABOLIC PANEL, COMPREHENSIVE    Collection Time: 08/07/18 11:20 AM Result Value Ref Range    Sodium 139 136 - 145 mmol/L    Potassium 4.3 3.5 - 5.1 mmol/L    Chloride 103 97 - 108 mmol/L    CO2 28 21 - 32 mmol/L    Anion gap 8 5 - 15 mmol/L    Glucose 88 65 - 100 mg/dL    BUN 14 6 - 20 MG/DL    Creatinine 0.99 0.70 - 1.30 MG/DL    BUN/Creatinine ratio 14 12 - 20      GFR est AA >60 >60 ml/min/1.73m2    GFR est non-AA >60 >60 ml/min/1.73m2    Calcium 8.7 8.5 - 10.1 MG/DL    Bilirubin, total 1.0 0.2 - 1.0 MG/DL    ALT (SGPT) 33 12 - 78 U/L    AST (SGOT) 16 15 - 37 U/L    Alk.  phosphatase 86 45 - 117 U/L    Protein, total 8.0 6.4 - 8.2 g/dL    Albumin 3.8 3.5 - 5.0 g/dL    Globulin 4.2 (H) 2.0 - 4.0 g/dL    A-G Ratio 0.9 (L) 1.1 - 2.2     URINALYSIS W/ RFLX MICROSCOPIC    Collection Time: 08/07/18 11:20 AM   Result Value Ref Range    Color YELLOW/STRAW      Appearance CLEAR CLEAR      Specific gravity 1.016 1.003 - 1.030      pH (UA) 7.0 5.0 - 8.0      Protein NEGATIVE  NEG mg/dL    Glucose NEGATIVE  NEG mg/dL    Ketone NEGATIVE  NEG mg/dL    Bilirubin NEGATIVE  NEG      Blood NEGATIVE  NEG      Urobilinogen 1.0 0.2 - 1.0 EU/dL    Nitrites NEGATIVE  NEG      Leukocyte Esterase NEGATIVE  NEG     MAGNESIUM    Collection Time: 08/07/18 11:20 AM   Result Value Ref Range    Magnesium 2.1 1.6 - 2.4 mg/dL   CK W/ CKMB & INDEX    Collection Time: 08/07/18 11:20 AM   Result Value Ref Range    CK 60 39 - 308 U/L    CK - MB <1.0 <3.6 NG/ML    CK-MB Index Cannot be calculated 0 - 2.5     TROPONIN I    Collection Time: 08/07/18 11:20 AM   Result Value Ref Range    Troponin-I, Qt. <0.05 <0.05 ng/mL   EKG, 12 LEAD, INITIAL    Collection Time: 08/07/18 11:43 AM   Result Value Ref Range    Ventricular Rate 58 BPM    Atrial Rate 58 BPM    P-R Interval 170 ms    QRS Duration 88 ms    Q-T Interval 392 ms    QTC Calculation (Bezet) 384 ms    Calculated P Axis 8 degrees    Calculated R Axis -10 degrees    Calculated T Axis 26 degrees    Diagnosis       Sinus bradycardia  Inferior infarct , age undetermined  When compared with ECG of 02-AUG-2018 21:55,  No significant change was found     GLUCOSE, POC    Collection Time: 08/07/18 11:55 AM   Result Value Ref Range    Glucose (POC) 99 65 - 100 mg/dL    Performed by Rambo Colby (PCT)    POC INR    Collection Time: 08/07/18 11:56 AM   Result Value Ref Range    INR (POC) 1.0 <1.2

## 2018-08-07 NOTE — ED NOTES
Pt reports he was seen in ED on Thursday for facial droop, was discharge from inpatient after testing on Saturday, diagnosed with TIA, blocked artery in neck, returns today with complaints of numbness in bilateral hands beginning yesterday around 3-4 pm, complained of headache this morning with continued bilateral hand numbness, upon arrival to treatment room pt is alert and oriented x4, able to move all extremities without difficulty, ambulatory to restroom with steady gait

## 2018-08-07 NOTE — ED NOTES
TRANSFER - OUT REPORT:    Verbal report given to Charlotte RN(name) on Crissie Im  being transferred to neuro(unit) for routine progression of care       Report consisted of patients Situation, Background, Assessment and   Recommendations(SBAR). Information from the following report(s) SBAR, ED Summary, [facility] and Recent Results was reviewed with the receiving nurse. Lines:   Peripheral IV 08/07/18 Right Antecubital (Active)   Site Assessment Clean, dry, & intact 8/7/2018 11:20 AM   Phlebitis Assessment 0 8/7/2018 11:20 AM   Infiltration Assessment 0 8/7/2018 11:20 AM   Dressing Status Clean, dry, & intact 8/7/2018 11:20 AM   Dressing Type Tape;Transparent 8/7/2018 11:20 AM   Hub Color/Line Status Green;Flushed 8/7/2018 11:20 AM   Action Taken Blood drawn 8/7/2018 11:20 AM        Opportunity for questions and clarification was provided.

## 2018-08-07 NOTE — IP AVS SNAPSHOT
Summary of Care Report The Summary of Care report has been created to help improve care coordination. Users with access to [employer] or [address] (Web-based application) may access additional patient information including the Discharge Summary. If you are not currently a [address] user and need more information, please call the number listed below in the [place] [address] section and ask to be connected with Medical Records. Facility Information Name Address Phone [address] [address] [phone] Patient Information Patient Name Sex WILLIAM Song ([id number]) Male 1955 Discharge Information Admitting Provider Service Area Unit Mireya Ortiz MD / [phone] [address] [facility] 2 General Surgery / [phone] Discharge Provider Discharge Date/Time Discharge Disposition Destination (none) 8/15/2018 (Pending) AHR (none) Patient Language Language ENGLISH [13] Hospital Problems as of 8/15/2018  Reviewed: 2018  3:23 PM by Lauren Sanchez MD  
  
  
  
 Class Noted - Resolved Last Modified POA Active Problems Stroke (cerebrum) ([facility] [address]  2018 - Present 2018 by Richelle Lawrence MD Unknown Entered by Richelle Lawrence MD  
  Bilateral carpal tunnel syndrome  2018 - Present 2018 by Jody Lawton MD Unknown Entered by Jody Lawton MD  
  Transient ischemic attack involving right internal carotid artery  2018 - Present 2018 by Jody Lawton MD Unknown Entered by Jody Lawton MD  
  Bilateral carotid artery stenosis  2018 - Present 2018 by Jody Lawton MD Unknown Entered by Jody Lawton MD  
  Carotid artery stenosis with cerebral infarction [facility])  8/10/2018 - Present 8/10/2018 by Mireya Ortiz MD Unknown Entered by Krystal Bryan MD  
  
Non-Hospital Problems as of 8/15/2018  Reviewed: 8/9/2018  3:23 PM by Layo Benedict MD  
  
  
  
 Class Noted - Resolved Last Modified Active Problems TIA (transient ischemic attack)  8/2/2018 - Present 8/2/2018 by Ta Arroyo MD  
  Entered by Ta Arroyo MD  
  Hemiplegic migraine  8/2/2018 - Present 8/2/2018 by Ta Arroyo MD  
  Entered by Ta Arroyo MD  
  Essential hypertension  8/2/2018 - Present 8/2/2018 by Ta Arroyo MD  
  Entered by Ta Arroyo MD  
  Tobacco abuse (Chronic)  8/2/2018 - Present 8/2/2018 by Ta Arroyo MD  
  Entered by Ta Arroyo MD  
  
You are allergic to the following Allergen Reactions Iodine Hives Current Discharge Medication List  
  
START taking these medications Dose & Instructions Dispensing Information Comments HYDROcodone-acetaminophen  mg tablet Commonly known as:  Iven Buchanan Dose:  1 Tab Take 1 Tab by mouth every six (6) hours as needed. Max Daily Amount: 4 Tabs. Quantity:  20 Tab Refills:  0 CONTINUE these medications which have NOT CHANGED Dose & Instructions Dispensing Information Comments  
 aspirin 81 mg chewable tablet Dose:  81 mg Take 1 Tab by mouth daily. Quantity:  30 Tab Refills:  0  
   
 atorvastatin 40 mg tablet Commonly known as:  LIPITOR Dose:  40 mg Take 1 Tab by mouth nightly. Quantity:  30 Tab Refills:  0  
   
 colestipol 1 gram tablet Commonly known as:  COLESTID Dose:  2 g Take 2 Tabs by mouth daily. Quantity:  30 Tab Refills:  0  
   
 lisinopril 5 mg tablet Commonly known as:  Elayne Araiza Dose:  5 mg Take 1 Tab by mouth daily. Quantity:  30 Tab Refills:  0  
   
 metoprolol tartrate 25 mg tablet Commonly known as:  LOPRESSOR Dose:  12.5 mg Take 0.5 Tabs by mouth two (2) times a day. Quantity:  30 Tab Refills:  0 Surgery Information ID Date/Time Status Primary Surgeon All Procedures Location [id number] 8/10/2018 London Bocanegra MD RIGHT CAROTID ARTERY ENDARTERECTOMY [facility]    
 [id number] 2018 0730 Posted Alondra Cervantes MD LEFT CAROTID ARTERY ENDARTERECTOMY [facility] Follow-up Information Follow up With Details Comments Contact Info None   None ([phone]) Patient stated that they have no PCP Discharge Instructions Patient Discharge Instructions Carola Waldrop / [id number] : 1955 Admitted 2018 Discharged: 8/15/2018 What to do at [facility] No driving May shower friday Information obtained by : 
I understand that if any problems occur once I am at home I am to contact my physician. I understand and acknowledge receipt of the instructions indicated above. R.N.'s Signature                                                                  Date/Time Patient or Representative Signature                                                          Date/Time Alondra Cervantes MD 
 
 
Chart Review Routing History Recipient Method Report Sent By Gee Cervantes MD  
Fax: [phone] Phone: [phone] Fax IP Auto Routed George Weinstein MD [[id number]] 2018  7:49 AM 2018 Alondra Cervantes MD  
Fax: [phone] Phone: [phone] Fax IP Auto Routed George Weinstein MD [[id number]] 2018  7:52 AM 2018

## 2018-08-07 NOTE — IP AVS SNAPSHOT
[address] [facility] 
[phone] Patient: Carola Waldrop MRN: [id number] :1955 About your hospitalization You were admitted on:  2018 You last received care in the:  [facility] 2 GENERAL SURGERY You were discharged on:  August 15, 2018 Why you were hospitalized Your primary diagnosis was:  Not on File Your diagnoses also included:  Stroke (Cerebrum) (Hcc), Bilateral Carpal Tunnel Syndrome, Transient Ischemic Attack Involving Right Internal Carotid Artery, Bilateral Carotid Artery Stenosis, Carotid Artery Stenosis With Cerebral Infarction (Hcc) Follow-up Information Follow up With Details Comments Contact Info None   None ([phone]) Patient stated that they have no PCP Your Scheduled Appointments   2:00 PM EDT New Patient with Lamar Hameed MD  
Neurology Clinic at [address], [address] [facility]  
[phone] Discharge Orders None A check nnamdi indicates which time of day the medication should be taken. My Medications START taking these medications Instructions Each Dose to Equal  
 Morning Noon Evening Bedtime HYDROcodone-acetaminophen  mg tablet Commonly known as:  Jorge Ill Your last dose was: Your next dose is: Take 1 Tab by mouth every six (6) hours as needed. Max Daily Amount: 4 Tabs. 1 Tab CONTINUE taking these medications Instructions Each Dose to Equal  
 Morning Noon Evening Bedtime  
 aspirin 81 mg chewable tablet Your last dose was: Your next dose is: Take 1 Tab by mouth daily. 81 mg  
    
   
   
   
  
 atorvastatin 40 mg tablet Commonly known as:  LIPITOR Your last dose was: Your next dose is: Take 1 Tab by mouth nightly. 40 mg  
    
   
   
   
  
 colestipol 1 gram tablet Commonly known as:  COLESTID Your last dose was: Your next dose is: Take 2 Tabs by mouth daily. 2 g  
    
   
   
   
  
 lisinopril 5 mg tablet Commonly known as:  Sonu Economy Your last dose was: Your next dose is: Take 1 Tab by mouth daily. 5 mg  
    
   
   
   
  
 metoprolol tartrate 25 mg tablet Commonly known as:  LOPRESSOR Your last dose was: Your next dose is: Take 0.5 Tabs by mouth two (2) times a day. 12.5 mg Where to Get Your Medications Information on where to get these meds will be given to you by the nurse or doctor. ! Ask your nurse or doctor about these medications HYDROcodone-acetaminophen  mg tablet Opioid Education Prescription Opioids: What You Need to Know: 
 
 
 
  
  
  
Introducing \[facility]\"" & HEALTH SERVICES! [employer] introduces Zervet patient portal. Now you can access parts of your medical record, email your doctor's office, and request medication refills online. 1. In your internet browser, go to [email or web address] 2. Click on the First Time User? Click Here link in the Sign In box. You will see the New Member Sign Up page. 3. Enter your Exergyn Access Code exactly as it appears below. You will not need to use this code after youve completed the sign-up process. If you do not sign up before the expiration date, you must request a new code. · Exergyn Access Code: [email or web address] Expires: 10/4/2018  1:12 PM 
 
4. Enter the last four digits of your Social Security Number (xxxx) and Date of Birth (mm/dd/yyyy) as indicated and click Submit. You will be taken to the next sign-up page. 5. Create a Exergyn ID. This will be your Exergyn login ID and cannot be changed, so think of one that is secure and easy to remember. 6. Create a Exergyn password. You can change your password at any time. 7. Enter your Password Reset Question and Answer. This can be used at a later time if you forget your password. 8. Enter your e-mail address. You will receive e-mail notification when new information is available in [address] 9. Click Sign Up. You can now view and download portions of your medical record. 10. Click the Download Summary menu link to download a portable copy of your medical information. If you have questions, please visit the Frequently Asked Questions section of the Exergyn website. Remember, Exergyn is NOT to be used for urgent needs. For medical emergencies, dial 911. Now available from your iPhone and Android! Introducing Avery Torres As a [facility] patient, I wanted to make you aware of our electronic visit tool called Avery Donaldfin. Jack On Block/7 allows you to connect within minutes with a medical provider 24 hours a day, seven days a week via a mobile device or tablet or logging into a secure website from your computer. You can access [employer] from anywhere in the [place]. A virtual visit might be right for you when you have a simple condition and feel like you just dont want to get out of bed, or cant get away from work for an appointment, when your regular [facility] provider is not available (evenings, weekends or holidays), or when youre out of town and need minor care. Electronic visits cost only $49 and if the Jack On Block/7 provider determines a prescription is needed to treat your condition, one can be electronically transmitted to a nearby pharmacy*. Please take a moment to enroll today if you have not already done so. The enrollment process is free and takes just a few minutes. To enroll, please download the Jack On Block/Wireless Toyz roselyn to your tablet or phone, or visit [email or web address] to enroll on your computer. And, as an [address] [place] [address] patient with a RealMassive account, the results of your visits will be scanned into your electronic medical record and your primary care provider will be able to view the scanned results. We urge you to continue to see your regular [facility] provider for your ongoing medical care. And while your primary care provider may not be the one available when you seek a Avery Torres virtual visit, the peace of mind you get from getting a real diagnosis real time can be priceless. For more information on Avery Trusted Hands Networkmarlysfin, view our Frequently Asked Questions (FAQs) at [email or web address]. Sincerely, 
 
Valentino Milks, MD 
Chief Medical Officer [facility] *:  certain medications cannot be prescribed via Avery Torres Providers Seen During Your Hospitalization Provider Specialty Primary office phone Akua Ellis MD Emergency Medicine [phone] Rosa Maria Osborn MD Internal Medicine [phone] John Morse MD Internal Medicine [phone] Royal North MD General and Vascular Surgery [phone] Your Primary Care Physician (PCP) Primary Care Physician Office Phone Office Fax NONE ** None ** ** None ** You are allergic to the following Allergen Reactions Iodine Hives Recent Documentation Height Weight BMI Smoking Status 1.829 m 113.4 kg 33.91 kg/m2 Never Smoker Emergency Contacts Name Discharge Info Relation Home Work Mobile Hadley Rehman YES [1] Unknown [9]   [phone] Gina Dockery DISCHARGE CAREGIVER [3] Friend [5] [phone] Patient Belongings The following personal items are in your possession at time of discharge: 
  Dental Appliances: None  Visual Aid: Glasses, With patient      Home Medications: Kept at bedside   Jewelry: None       Other Valuables: None Please provide this summary of care documentation to your next provider. Signatures-by signing, you are acknowledging that this After Visit Summary has been reviewed with you and you have received a copy. Patient Signature:  ____________________________________________________________ Date:  ____________________________________________________________  
  
Johana Stern Provider Signature:  ____________________________________________________________ Date:  ____________________________________________________________

## 2018-08-08 ENCOUNTER — APPOINTMENT (OUTPATIENT)
Dept: CT IMAGING | Age: 63
DRG: 038 | End: 2018-08-08
Attending: HOSPITALIST
Payer: COMMERCIAL

## 2018-08-08 ENCOUNTER — APPOINTMENT (OUTPATIENT)
Dept: MRI IMAGING | Age: 63
DRG: 038 | End: 2018-08-08
Attending: PSYCHIATRY & NEUROLOGY
Payer: COMMERCIAL

## 2018-08-08 LAB
ERYTHROCYTE [SEDIMENTATION RATE] IN BLOOD: 17 MM/HR (ref 0–20)
EST. AVERAGE GLUCOSE BLD GHB EST-MCNC: 134 MG/DL
GLUCOSE BLD STRIP.AUTO-MCNC: 163 MG/DL (ref 65–100)
GLUCOSE BLD STRIP.AUTO-MCNC: 164 MG/DL (ref 65–100)
GLUCOSE BLD STRIP.AUTO-MCNC: 206 MG/DL (ref 65–100)
GLUCOSE BLD STRIP.AUTO-MCNC: 206 MG/DL (ref 65–100)
HBA1C MFR BLD: 6.3 % (ref 4.2–6.3)
SERVICE CMNT-IMP: ABNORMAL

## 2018-08-08 PROCEDURE — G8987 SELF CARE CURRENT STATUS: HCPCS

## 2018-08-08 PROCEDURE — 74011250636 HC RX REV CODE- 250/636: Performed by: SURGERY

## 2018-08-08 PROCEDURE — G8980 MOBILITY D/C STATUS: HCPCS

## 2018-08-08 PROCEDURE — 70496 CT ANGIOGRAPHY HEAD: CPT

## 2018-08-08 PROCEDURE — 65660000000 HC RM CCU STEPDOWN

## 2018-08-08 PROCEDURE — A9585 GADOBUTROL INJECTION: HCPCS | Performed by: INTERNAL MEDICINE

## 2018-08-08 PROCEDURE — 86235 NUCLEAR ANTIGEN ANTIBODY: CPT | Performed by: PSYCHIATRY & NEUROLOGY

## 2018-08-08 PROCEDURE — 74011636320 HC RX REV CODE- 636/320: Performed by: INTERNAL MEDICINE

## 2018-08-08 PROCEDURE — 70553 MRI BRAIN STEM W/O & W/DYE: CPT

## 2018-08-08 PROCEDURE — 97162 PT EVAL MOD COMPLEX 30 MIN: CPT

## 2018-08-08 PROCEDURE — 74011636637 HC RX REV CODE- 636/637: Performed by: HOSPITALIST

## 2018-08-08 PROCEDURE — 74011250636 HC RX REV CODE- 250/636: Performed by: EMERGENCY MEDICINE

## 2018-08-08 PROCEDURE — G8988 SELF CARE GOAL STATUS: HCPCS

## 2018-08-08 PROCEDURE — 74011250636 HC RX REV CODE- 250/636: Performed by: INTERNAL MEDICINE

## 2018-08-08 PROCEDURE — G8978 MOBILITY CURRENT STATUS: HCPCS

## 2018-08-08 PROCEDURE — 85652 RBC SED RATE AUTOMATED: CPT | Performed by: PSYCHIATRY & NEUROLOGY

## 2018-08-08 PROCEDURE — G8979 MOBILITY GOAL STATUS: HCPCS

## 2018-08-08 PROCEDURE — 83036 HEMOGLOBIN GLYCOSYLATED A1C: CPT | Performed by: HOSPITALIST

## 2018-08-08 PROCEDURE — 97165 OT EVAL LOW COMPLEX 30 MIN: CPT

## 2018-08-08 PROCEDURE — 36415 COLL VENOUS BLD VENIPUNCTURE: CPT | Performed by: PSYCHIATRY & NEUROLOGY

## 2018-08-08 PROCEDURE — 74011250637 HC RX REV CODE- 250/637: Performed by: SURGERY

## 2018-08-08 PROCEDURE — 74011250637 HC RX REV CODE- 250/637: Performed by: HOSPITALIST

## 2018-08-08 PROCEDURE — 97535 SELF CARE MNGMENT TRAINING: CPT | Performed by: OCCUPATIONAL THERAPIST

## 2018-08-08 PROCEDURE — 86225 DNA ANTIBODY NATIVE: CPT | Performed by: PSYCHIATRY & NEUROLOGY

## 2018-08-08 PROCEDURE — 74011636637 HC RX REV CODE- 636/637: Performed by: INTERNAL MEDICINE

## 2018-08-08 PROCEDURE — 70548 MR ANGIOGRAPHY NECK W/DYE: CPT

## 2018-08-08 PROCEDURE — 86038 ANTINUCLEAR ANTIBODIES: CPT | Performed by: PSYCHIATRY & NEUROLOGY

## 2018-08-08 PROCEDURE — G8989 SELF CARE D/C STATUS: HCPCS

## 2018-08-08 PROCEDURE — 82962 GLUCOSE BLOOD TEST: CPT

## 2018-08-08 PROCEDURE — 97116 GAIT TRAINING THERAPY: CPT

## 2018-08-08 PROCEDURE — 70544 MR ANGIOGRAPHY HEAD W/O DYE: CPT

## 2018-08-08 RX ORDER — DIPHENHYDRAMINE HYDROCHLORIDE 50 MG/ML
50 INJECTION, SOLUTION INTRAMUSCULAR; INTRAVENOUS ONCE
Status: COMPLETED | OUTPATIENT
Start: 2018-08-08 | End: 2018-08-08

## 2018-08-08 RX ORDER — INSULIN LISPRO 100 [IU]/ML
INJECTION, SOLUTION INTRAVENOUS; SUBCUTANEOUS
Status: DISCONTINUED | OUTPATIENT
Start: 2018-08-08 | End: 2018-08-12

## 2018-08-08 RX ORDER — CLOPIDOGREL BISULFATE 75 MG/1
75 TABLET ORAL DAILY
Status: DISCONTINUED | OUTPATIENT
Start: 2018-08-08 | End: 2018-08-12

## 2018-08-08 RX ORDER — SODIUM CHLORIDE 9 MG/ML
50 INJECTION, SOLUTION INTRAVENOUS
Status: DISPENSED | OUTPATIENT
Start: 2018-08-08 | End: 2018-08-08

## 2018-08-08 RX ORDER — DEXTROSE 50 % IN WATER (D50W) INTRAVENOUS SYRINGE
12.5-25 AS NEEDED
Status: DISCONTINUED | OUTPATIENT
Start: 2018-08-08 | End: 2018-08-15 | Stop reason: HOSPADM

## 2018-08-08 RX ORDER — SODIUM CHLORIDE 0.9 % (FLUSH) 0.9 %
10 SYRINGE (ML) INJECTION
Status: DISPENSED | OUTPATIENT
Start: 2018-08-08 | End: 2018-08-08

## 2018-08-08 RX ORDER — ENOXAPARIN SODIUM 100 MG/ML
40 INJECTION SUBCUTANEOUS EVERY 24 HOURS
Status: DISCONTINUED | OUTPATIENT
Start: 2018-08-08 | End: 2018-08-10

## 2018-08-08 RX ORDER — MAGNESIUM SULFATE 100 %
4 CRYSTALS MISCELLANEOUS AS NEEDED
Status: DISCONTINUED | OUTPATIENT
Start: 2018-08-08 | End: 2018-08-10

## 2018-08-08 RX ADMIN — Medication 10 ML: at 03:19

## 2018-08-08 RX ADMIN — CLOPIDOGREL BISULFATE 75 MG: 75 TABLET ORAL at 13:06

## 2018-08-08 RX ADMIN — METOPROLOL TARTRATE 12.5 MG: 25 TABLET ORAL at 09:20

## 2018-08-08 RX ADMIN — PREDNISONE 50 MG: 5 TABLET ORAL at 01:19

## 2018-08-08 RX ADMIN — Medication 10 ML: at 13:06

## 2018-08-08 RX ADMIN — PREDNISONE 50 MG: 5 TABLET ORAL at 07:28

## 2018-08-08 RX ADMIN — Medication 10 ML: at 21:25

## 2018-08-08 RX ADMIN — DOCUSATE SODIUM 100 MG: 100 CAPSULE, LIQUID FILLED ORAL at 17:38

## 2018-08-08 RX ADMIN — ASPIRIN 81 MG: 81 TABLET ORAL at 09:19

## 2018-08-08 RX ADMIN — INSULIN LISPRO 2 UNITS: 100 INJECTION, SOLUTION INTRAVENOUS; SUBCUTANEOUS at 17:24

## 2018-08-08 RX ADMIN — IOPAMIDOL 100 ML: 755 INJECTION, SOLUTION INTRAVENOUS at 07:00

## 2018-08-08 RX ADMIN — DOCUSATE SODIUM 100 MG: 100 CAPSULE, LIQUID FILLED ORAL at 09:20

## 2018-08-08 RX ADMIN — ATORVASTATIN CALCIUM 40 MG: 40 TABLET, FILM COATED ORAL at 21:24

## 2018-08-08 RX ADMIN — COLESTIPOL HYDROCHLORIDE 2 G: 1 TABLET, FILM COATED ORAL at 09:19

## 2018-08-08 RX ADMIN — GADOBUTROL 10 ML: 604.72 INJECTION INTRAVENOUS at 19:34

## 2018-08-08 RX ADMIN — ENOXAPARIN SODIUM 40 MG: 40 INJECTION SUBCUTANEOUS at 13:06

## 2018-08-08 RX ADMIN — DIPHENHYDRAMINE HYDROCHLORIDE 50 MG: 50 INJECTION, SOLUTION INTRAMUSCULAR; INTRAVENOUS at 07:29

## 2018-08-08 RX ADMIN — METOPROLOL TARTRATE 12.5 MG: 25 TABLET ORAL at 17:38

## 2018-08-08 NOTE — PROGRESS NOTES
Hospitalist Progress Note    NAME: Salma Akins   :  1955   MRN:  [id number]     This patient is at above high risk of deterioration based on documented presenting clinical data, comorbid conditions, high risk of adverse events and current acute care course. Assessment / Plan:  TIA vs recurrent stroke  Severe bilateral carotid stenoses, POA  Recent embolic stroke right corona radiata and right parietal lobe 8/3/18  -symptoms nearly resolved  -known and already recognized bilateral carotid disease - pending vascular surgery to help determine next steps - surgery sooner vs later and which medicine they prefer him on - asa + plavix vs no plavix  -continue with pt/ot  -lipitor continues  -bp control with lopressor. bp stable     HTN  -lopressor only for now - consider restarting acei once decision on surgery noted     Hyperlipidemia  --continue statin started last week      Tobacco abuse  --chew snuff educated cessation - he will try     Obesity  Body mass index is 34.62 kg/(m^2). Borderline diabetes  -a1c 6.3, random bs 160-200. Will continue to track for now - he is interested in diet and exercise. DTC to see for OP education     Code:  full  DVT prophylaxis: SCD  Surrogate decision maker:  Has daughter Philip Kumar in [place]. Emergency contact friend Isaias Jacob [phone]    Code status: Full  Prophylaxis: SCD's  Recommended Disposition: Home w/Family, SNF/LTC and  PT, OT, RN     Medical Decision Making Today  · Acute or chronic illness that poses a threat to life or bodily function  · I have reviewed the flowsheet and previous days notes  · One or more chronic illnesses with severe exacerbation, progression or side effects of treatment  · Review and order of Clinical lab tests  · Discuss case with Specialist MD    Subjective:     Chief Complaint / Reason for Physician Visit  \"i feel fine\". Discussed with RN events overnight. Still with intermittent hand tingling.  No ha/change vision. Review of Systems:  Symptom Y/N Comments  Symptom Y/N Comments   Fever/Chills n   Chest Pain n    Poor Appetite n   Edema     Cough    Abdominal Pain n    Sputum n   Joint Pain     SOB/COATS n   Pruritis/Rash     Nausea/vomit n   Tolerating PT/OT     Diarrhea    Tolerating Diet y    Constipation    Other       Could NOT obtain due to:      Objective:     VITALS:   Last 24hrs VS reviewed since prior progress note. Most recent are:  Patient Vitals for the past 24 hrs:   Temp Pulse Resp BP SpO2   08/08/18 0317 97.6 °F (36.4 °C) (!) 58 18 125/55 96 %   08/07/18 2337 98.4 °F (36.9 °C) 68 20 148/52 96 %   08/07/18 1757 98.2 °F (36.8 °C) 66 20 140/59 98 %   08/07/18 1700 98.3 °F (36.8 °C) 65 15 150/71 98 %   08/07/18 1600 - - - 157/67 98 %   08/07/18 1530 - - - 130/59 98 %   08/07/18 1500 - - - 148/55 96 %   08/07/18 1300 - - - 186/60 98 %   08/07/18 1059 98.3 °F (36.8 °C) (!) 58 16 143/63 98 %     No intake or output data in the 24 hours ending 08/08/18 0701     PHYSICAL EXAM:  General: WD, obese m sitting up in bed, interactive, Alert, cooperative, no acute distress    EENT:  EOMI. Anicteric sclerae. MM dry  Resp:  CTA bilaterally, no wheezing or rales. No accessory muscle use  CV:  2/6 thierry. Regular  rhythm,  No edema  GI:  Soft, Non distended, Non tender.  +Bowel sounds  Neurologic:  Alert and oriented X 3, normal speech  Psych:   Good insight. Not anxious nor agitated  Skin:  no rashes or ulcers. No jaundice    Reviewed most current lab test results and cultures  YES  Reviewed most current radiology test results   YES  Review and summation of old records today    NO  Reviewed patient's current orders and MAR    YES  PMH/SH reviewed - no change compared to H&P  ________________________________________________________________________  Care Plan discussed with:    Comments   Patient x Discussed with patient in room. POC discussed.   Questions answered (17   Family      RN x    Care Manager     Consultant: stalin Boateng [address] [facility] 5                    x Multidiciplinary team rounds were held today with , nursing, pharmacist and clinical coordinator. Patient's plan of care was discussed; medications were reviewed and discharge planning was addressed. 5   ________________________________________________________________________  Total NON critical care TIME:  35   Minutes    Total CRITICAL CARE TIME Spent:   Minutes non procedure based. I have provided critical care time. During this entire length of time I was immediately available to the patient. The reason for providing this level of medical care was due to a critical illness that impaired one or more vital organ systems, such that there was a high probability of imminent or life threatening deterioration in the patient's condition. This care involved high complexity decision making which includes reviewing the patient's past medical records, current laboratory results, and actual Xray films in order to assess, support vital system function, and to treat this degree of vital organ system failure, and to prevent further life threatening deterioration of the patients condition. Comments   >50% of visit spent in counseling and coordination of care x See above   ________________________________________________________________________  Procedures: see electronic medical records for all procedures/Xrays and details which were not copied into this note but were reviewed prior to creation of Plan.       LABS:  Recent Labs      08/07/18   1120   WBC  9.8   HGB  14.6   HCT  42.8   PLT  286     Recent Labs      08/07/18   1120   NA  139   K  4.3   CL  103   CO2  28   BUN  14   CREA  0.99   GLU  88   CA  8.7   MG  2.1     Recent Labs      08/07/18   1120   SGOT  16   ALT  33   AP  86   TBILI  1.0   TP  8.0   ALB  3.8   GLOB  4.2*     Recent Labs      08/07/18   1156  08/07/18   1120   INR  1.0  1.0   PTP   --   10.0      No results for input(s): FE, TIBC, PSAT, FERR in the last 72 hours. No results found for: FOL, RBCF   No results for input(s): PH, PCO2, PO2 in the last 72 hours. No results for input(s): PHI, PO2I, PCO2I in the last 72 hours. Recent Labs      08/07/18   1120   CPK  60   CKNDX  Cannot be calculated   TROIQ  <0.05     Lab Results   Component Value Date/Time    Cholesterol, total 173 08/03/2018 05:40 AM    HDL Cholesterol 38 08/03/2018 05:40 AM    LDL, calculated 107.6 (H) 08/03/2018 05:40 AM    Triglyceride 137 08/03/2018 05:40 AM    CHOL/HDL Ratio 4.6 08/03/2018 05:40 AM     Lab Results   Component Value Date/Time    Glucose (POC) 167 (H) 08/07/2018 09:41 PM    Glucose (POC) 99 08/07/2018 11:55 AM    Glucose (POC) 91 08/02/2018 06:30 PM     Lab Results   Component Value Date/Time    Color YELLOW/STRAW 08/07/2018 11:20 AM    Appearance CLEAR 08/07/2018 11:20 AM    Specific gravity 1.016 08/07/2018 11:20 AM    pH (UA) 7.0 08/07/2018 11:20 AM    Protein NEGATIVE  08/07/2018 11:20 AM    Glucose NEGATIVE  08/07/2018 11:20 AM    Ketone NEGATIVE  08/07/2018 11:20 AM    Bilirubin NEGATIVE  08/07/2018 11:20 AM    Urobilinogen 1.0 08/07/2018 11:20 AM    Nitrites NEGATIVE  08/07/2018 11:20 AM    Leukocyte Esterase NEGATIVE  08/07/2018 11:20 AM       RADIOGRAPHIC STUDIES:  CXR Results  (Last 48 hours)    None          CT Results  (Last 48 hours)               08/07/18 1133  CT HEAD WO CONT Final result    Impression:  IMPRESSION: No acute process or change compared to the prior exam.               Narrative:  EXAM:  CT HEAD WO CONT       INDICATION:   Bilateral hand numbness since yesterday       COMPARISON: 8/2/2018. CONTRAST:  None. TECHNIQUE: Unenhanced CT of the head was performed using 5 mm images. Brain and   bone windows were generated. CT dose reduction was achieved through use of a   standardized protocol tailored for this examination and automatic exposure   control for dose modulation.          FINDINGS:   The ventricles and sulci are normal in size, shape and configuration and   midline. There is no significant white matter disease. There is no intracranial   hemorrhage, extra-axial collection, mass, mass effect or midline shift. The   basilar cisterns are open. No acute infarct is identified. The bone windows   demonstrate no abnormalities. The visualized portions of the paranasal sinuses   and mastoid air cells are clear. Echo Results  (Last 48 hours)    None          VENOUS DOPPLER results  (Last 48 hours)    None          CULTURES:    No results found for: SDES No results found for: CULT       Signed: Joey Ruano MD    This note will not be viewable in [address]

## 2018-08-08 NOTE — PROGRESS NOTES
Occupational Therapy EVALUATION/discharge  Patient: Brenda Bess (44 y.o. male)  Date: 8/8/2018  Primary Diagnosis: Stroke (cerebrum) [facility])        Precautions: none       ASSESSMENT:   Based on the objective data described below, the patient presents close to ADL baseline but continued c/o B hand numbness that follows median nerve innervation on R hand but c/o numbness to all digits on the L hand. Pt was able to perform tying the gown strings and snapping/unsnapping gown buttons without assistance in functional amount of time. He is independent for ADLs and mobility. Per neuro MD, pt with possible B carpal tunnel syndrome 2* works as . Pt negative for Phalen's test but possibly positive for Tinels per neuro. Pt with hx of recent CVA 8/2/18 of R parietal and corona radiata and B carotid stenosis 80-99%. If pt's hand numbness continuse to be an issue and neurological cause is rule out, may benefit from OP OT to include [place] Harden sensation testing and further carpal tunnel testing. Further skilled acute occupational therapy is not indicated at this time. 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     OBJECTIVE DATA SUMMARY:   HISTORY:   Past Medical History:   Diagnosis Date    Bilateral carotid artery stenosis     880-99%    Stroke (cerebrum) ([address] 08/02/2018    multiple embolic stroke on right coronal radiata and right parietal     Past Surgical History:   Procedure Laterality Date    HX CHOLECYSTECTOMY         Prior Level of Function/Environment/Context: lives with roommates, independent with ADLs, drives and works construction.   Occupations in which the patient is/was successful, what are the barriers preventing that success:   Performance Patterns (routines, roles, habits, and rituals):   Personal Interests and/or values:   Expanded or extensive additional review of patient history: acute CVA 8/2/18 R parietal and corona radiata    Home Situation  Home Environment: Private residence  # Steps to Enter: 3  Rails to Enter: Yes  Hand Rails : Bilateral  One/Two Story Residence: One story  Living Alone: No  Support Systems: Friends \ neighbors  Patient Expects to be Discharged to[de-identified] Unknown  Current DME Used/Available at Home: Grab bars  Tub or Shower Type: Tub/Shower combination    Hand dominance: Right    EXAMINATION OF PERFORMANCE DEFICITS:  Cognitive/Behavioral Status:  Neurologic State: Alert  Orientation Level: Oriented X4  Cognition: Appropriate decision making; Appropriate for age attention/concentration; Appropriate safety awareness             Skin: intact    Edema: none noted    Hearing: Auditory  Auditory Impairment: None    Vision/Perceptual:            intact                         Range of Motion:    AROM: Within functional limits  PROM: Within functional limits                      Strength:    Strength: Within functional limits                Coordination:  Coordination: Within functional limits  Fine Motor Skills-Upper: Left Intact; Right Intact         Tone & Sensation:       Sensation: Impaired (in B hands )                      Balance:  Sitting: Intact; Without support  Standing: Intact; Without support    Functional Mobility and Transfers for ADLs:  Bed Mobility:       Transfers:  Sit to Stand: Independent  Stand to Sit: Independent  Bed to Chair: Independent  Toilet Transfer : Independent    ADL Assessment:  Feeding: Independent    Oral Facial Hygiene/Grooming: Independent    Bathing: Independent    Upper Body Dressing: Independent    Lower Body Dressing: Independent    Toileting: Independent                ADL Intervention and task modifications:      Will provide pt with fine motor activity handout                      Functional Measure:  Fugl-Vanessa Assessment of Motor Recovery after Stroke:     Reflex Activity  Flexors/Biceps/Fingers: Can be elicited  Extensors/Triceps: Can be elicited  Reflex Subtotal: 4    Volitional Movement Within Synergies  Shoulder Retraction: Full  Shoulder Elevation: Full  Shoulder Abduction (90 degrees): Full  Shoulder External Rotation: Full  Elbow Flexion: Full  Forearm Supination: Full  Shoulder Adduction/Internal Rotation: Full  Elbow Extension: Full  Forearm Pronation: Full  Subtotal: 18    Volitional Movement Mixing Synergies  Hand to Lumbar Spine: Full  Shoulder Flexion (0-90 degrees): Full  Pronation-Supination: Full  Subtotal: 6    Volitional Movement With Little or No Synergy  Shoulder Abduction (0-90 degrees): Full  Shoulder Flexion ( degrees): Full  Pronation/Supination: Full  Subtotal : 6    Normal Reflex Activity  Biceps, Triceps, Finger Flexors: Full  Subtotal : 2    Upper Extremity Total   Upper Extremity Total: 36    Wrist  Stability at 15 Degree Dorsiflexion: Full  Repeated Dorsiflexion/ Volar Flexion: Full  Stability at 15 Degree Dorsiflexion: Full  Repeated Dorsiflexion/ Volar Flexion: Full  Circumduction: Full  Wrist Total: 10    Hand  Mass Flexion: Full  Mass Extension: Full  Grasp A: Full  Grasp B: Full  Grasp C: Full  Grasp D: Full  Grasp E: Full  Hand Total: 14    Coordination/Speed  Tremor: None  Dysmetria: None  Time: <1s  Coordination/Speed Total : 6    Total A-D  Total A-D (Motor Function): 66/66       Percentage of impairment CH  0% CI  1-19% CJ  20-39% CK  40-59% CL  60-79% CM  80-99% CN  100%   Fugl-Vanessa score: 0-66 66 53-65 39-52 26-38 13-25 1-12   0      This is a reliable/valid measure of arm function after a neurological event. It has established value to characterize functional status and for measuring spontaneous and therapy-induced recovery; tests proximal and distal motor functions. Fugl-Vanessa Assessment  UE scores recorded between five and 30 days post neurologic event can be used to predict UE recovery at six months post neurologic event.   Severe = 0-21 points   Moderately Severe = 22-33 points   Moderate = 34-47 points   Mild = 48-66 points  JEANNINE Briones, LEONA Pizarro, & ARIC Olivia (1992). Measurement of motor recovery after stroke: Outcome assessment and sample size requirements. Stroke, 23, pp. 8246-5978.   --------------------------------------------------------------------------------------------------------------------------------------------------------------------  MCID:  Stroke:   Amanda Monique et al, 2001; n = 171; mean age 79 (5) years; assessed within 16 (12) days of stroke, Acute Stroke)  FMA Motor Scores from Admission to Discharge   10 point increase in FMA Upper Extremity = 1.5 change in discharge FIM   10 point increase in FMA Lower Extremity = 1.9 change in discharge FIM  MDC:   Stroke:   Ted Howard et al, 2008, n = 14, mean age = 59.9 (14.6) years, assessed on average 14 (6.5) months post stroke, Chronic Stroke)   FMA = 5.2 points for the Upper Extremity portion of the assessment       G codes: In compliance with CMSs Claims Based Outcome Reporting, the following G-code set was chosen for this patient based on their primary functional limitation being treated: The outcome measure chosen to determine the severity of the functional limitation was the bar with a score of 66/66 which was correlated with the impairment scale. ?  Self Care:     - CURRENT STATUS: CH - 0% impaired, limited or restricted    - GOAL STATUS: CH - 0% impaired, limited or restricted    - D/C STATUS:  CH - 0% impaired, limited or restricted     Occupational Therapy Evaluation Charge Determination   History Examination Decision-Making   LOW Complexity : Brief history review  LOW Complexity : 1-3 performance deficits relating to physical, cognitive , or psychosocial skils that result in activity limitations and / or participation restrictions  LOW Complexity : No comorbidities that affect functional and no verbal or physical assistance needed to complete eval tasks       Based on the above components, the patient evaluation is determined to be of the following complexity level: LOW   Pain:  Pain Scale 1: Numeric (0 - 10)  Pain Intensity 1: 0              Activity Tolerance:   VSS  Please refer to the flowsheet for vital signs taken during this treatment. After treatment:   [x]  Patient left in no apparent distress sitting up in chair  []  Patient left in no apparent distress in bed  [x]  Call bell left within reach  [x]  Nursing notified  []  Caregiver present  []  Bed alarm activated    COMMUNICATION/EDUCATION:   Communication/Collaboration:  []      Home safety education was provided and the patient/caregiver indicated understanding. [x]      Patient/family have participated as able and agree with findings and recommendations. []      Patient is unable to participate in plan of care at this time.   Findings and recommendations were discussed with: Physical Therapist and Registered Nurse    Anmol Palencia OT  Time Calculation: 32 mins

## 2018-08-08 NOTE — PROGRESS NOTES
* No surgery found *  * No surgery found *  Bedside and Verbal shift change report given to estee (oncoming nurse) by gaviota (offgoing nurse). Report included the following information SBAR, Kardex, Recent Results and Med Rec Status. Zone Phone:   [phone]      Significant changes during shift:  New admit        Patient Information    Braxton Gomez  57 y.o.  8/7/2018 10:59 AM by Chasity White MD. Braxton Gomez was admitted from Home    Problem List    Patient Active Problem List    Diagnosis Date Noted    Stroke (cerebrum) [facility]) 08/07/2018    Bilateral carpal tunnel syndrome 08/07/2018    Transient ischemic attack involving right internal carotid artery 08/07/2018    Bilateral carotid artery stenosis 08/07/2018    TIA (transient ischemic attack) 08/02/2018    Hemiplegic migraine 08/02/2018    Essential hypertension 08/02/2018    Tobacco abuse 08/02/2018     Past Medical History:   Diagnosis Date    Bilateral carotid artery stenosis     880-99%    Stroke (cerebrum) ([address] 08/02/2018    multiple embolic stroke on right coronal radiata and right parietal         Core Measures:    CVA: Yes Yes  CHF:No No  PNA:No No    Activity Status:    OOB to Chair Yes  Ambulated this shift Yes   Bed Rest No    Supplemental O2: (If Applicable)    NC No  NRB No  Venti-mask No  On  Liters/min      LINES AND DRAINS:    Central Line? No Placement date  Reason Medically Necessary     PICC LINE? No Placement date Reason Medically Necessary     Urinary Catheter? No Placement Date  Reason Medically Necessary     DVT prophylaxis:    DVT prophylaxis Med- Yes  DVT prophylaxis SCD or CHRIS- No     Wounds: (If Applicable)    Wounds- No    Location     Patient Safety:    Falls Score Total Score: 0  Safety Level_______  Bed Alarm On? No  Sitter?  No    Plan for upcoming shift: mri/mra, pt/ot, ct,         Discharge Plan: No     Active Consults:  IP CONSULT TO NEUROLOGY

## 2018-08-08 NOTE — PROGRESS NOTES
physical Therapy neuro EVALUATION/discharge     Patient: Cameron Boyle (40 y.o. male)  Date: 8/8/2018  Primary Diagnosis: Stroke (cerebrum) ([facility] [address]        Precautions:      Patient with subacute R parietal CVA, B carotid stenosis  ASSESSMENT :  Based on the objective data described below, the patient presents with good strength, good functional mobility, steady gait, and c/o B hand numbness following admission for stroke. PTA patient lives with friends who are very supportive. Patient is independent with all aspects of functional mobility and ADLs. He is very active and works full time as a . He denies any falls. Currently, patient received sitting in the chair, agreeable and cleared for therapy. Patient has been up ad ronni and denies any balance deficits. Patient is independent with transfers. Patient ambulated 200 feet independently with safe and steady gait. Patient performed head turns and nods and noted decreased gait speed although maintained balance. Patient with mild trunk sway which is baseline. Patient scored 52/56 on Mojica balance test, indicating mild fall risk. PT services are not indicated at this time as patient is at his baseline. Skilled physical therapy is not indicated at this time. PLAN :  Discharge Recommendations: None  Further Equipment Recommendations for Discharge: none       SUBJECTIVE:   Patient stated I feel pretty good but not 100% in my hands.     OBJECTIVE DATA SUMMARY:   HISTORY:    Past Medical History:   Diagnosis Date    Bilateral carotid artery stenosis     880-99%    Stroke (cerebrum) ([facility] [address] 08/02/2018    multiple embolic stroke on right coronal radiata and right parietal     Past Surgical History:   Procedure Laterality Date    HX CHOLECYSTECTOMY       Prior Level of Function/Home Situation: patient lives with friends who are very supportive. Patient is independent with all aspects of functional mobility and ADLs.  He is very active and works full time as a . He denies any falls. Personal factors and/or comorbidities impacting plan of care: CVA work up    [address]: Private residence  # Steps to Enter: 3  Rails to Enter: Yes  Hand Rails : Bilateral  One/Two Story Residence: One story  Living Alone: No  Support Systems: Friends \ neighbors  Patient Expects to be Discharged to[de-identified] Unknown  Current DME Used/Available at Home: Grab bars  Tub or Shower Type: Tub/Shower combination    EXAMINATION/PRESENTATION/DECISION MAKING:   Critical Behavior:  Neurologic State: Alert  Orientation Level: Oriented X4  Cognition: Appropriate decision making, Appropriate for age attention/concentration, Appropriate safety awareness     Hearing: Auditory  Auditory Impairment: None  Skin:    Edema:   Range Of Motion:  AROM: Within functional limits           PROM: Within functional limits           Strength:    Strength: Within functional limits                    Tone & Sensation:                  Sensation: Impaired (in B hands )               Coordination:  Coordination: Within functional limits  Vision:      Functional Mobility:  Bed Mobility:              Transfers:  Sit to Stand: Independent  Stand to Sit: Independent        Bed to Chair: Independent              Balance:   Sitting: Intact; Without support  Standing: Intact; Without support  Ambulation/Gait Training:  Distance (ft): 200 Feet (ft)  Assistive Device: Gait belt  Ambulation - Level of Assistance: Independent     Gait Description (WDL): Exceptions to WDL  Gait Abnormalities: Trunk sway increased        Base of Support: Widened     Speed/Colleen: Pace decreased (<100 feet/min)                        Stair Training:                Therapeutic Exercises:       Functional Measure:  Mojica Balance Test:    Sitting to Standin  Standing Unsupported: 4  Sitting with Back Unsupported: 4  Standing to Sittin  Transfers: 4  Standing Unsupported with Eyes Closed: 4  Standing Unsupported with Feet Together: 4  Reach Forward with Outstretched Arm: 4   Object: 4  Turn to Look Over Shoulders: 4  Turn 360 Degrees: 4  Alternate Foot on Step/Stool: 4  Standing Unsupported One Foot in Front: 3  Stand on One Le  Total: 52         56=Maximum possible score;   0-20=High fall risk  21-40=Moderate fall risk   41-56=Low fall risk     Mojica Balance Test and G-code impairment scale:  Percentage of Impairment CH    0%   CI    1-19% CJ    20-39% CK    40-59% CL    60-79% CM    80-99% CN     100%   Mojica   Score 0-56 56 45-55 34-44 23-33 12-22 1-11 0     G codes: In compliance with CMSs Claims Based Outcome Reporting, the following G-code set was chosen for this patient based on their primary functional limitation being treated: The outcome measure chosen to determine the severity of the functional limitation was the Turk with a score of 52/56 which was correlated with the impairment scale. ? Mobility - Walking and Moving Around:     - CURRENT STATUS: CI - 1%-19% impaired, limited or restricted    - GOAL STATUS: CI - 1%-19% impaired, limited or restricted    - D/C STATUS:  CI - 1%-19% impaired, limited or restricted      Physical Therapy Evaluation Charge Determination   History Examination Presentation Decision-Making   MEDIUM  Complexity : 1-2 comorbidities / personal factors will impact the outcome/ POC  MEDIUM Complexity : 3 Standardized tests and measures addressing body structure, function, activity limitation and / or participation in recreation  MEDIUM Complexity : Evolving with changing characteristics  Other outcome measures Mojica  LOW       Based on the above components, the patient evaluation is determined to be of the following complexity level: MEDIUM    Pain:  Pain Scale 1: Numeric (0 - 10)  Pain Intensity 1: 0              Activity Tolerance:   Good, at baseline functionally  Please refer to the flowsheet for vital signs taken during this treatment.   After treatment:   [x]         Patient left in no apparent distress sitting up in chair  []         Patient left in no apparent distress in bed  [x]         Call bell left within reach  [x]         Nursing notified  []         Caregiver present  []         Bed alarm activated    COMMUNICATION/EDUCATION:   Patient was educated regarding His deficit(s) of B hand tingling as this relates to His diagnosis of CVA/TIA workup. He demonstrated good understanding as evidenced by verbal feedback. Patient and/or family was verbally educated on the BE FAST acronym for signs/symptoms of CVA and TIA. BE FAST was written on patient's communication board  for visual education and reinforcement. All questions answered with patient indicating good understanding. [x]   Fall prevention education was provided and the patient/caregiver indicated understanding. [x]   Patient/family have participated as able and agree with findings and recommendations. []   Patient is unable to participate in plan of care at this time.     Findings and recommendations were discussed with: Occupational Therapist, Registered Nurse and     Thank you for this referral.  Rustam Méndez, PT, DPT   Time Calculation: 25 mins

## 2018-08-08 NOTE — CONSULTS
Vascular Surgery Consult Note  8/8/2018    Subjective:     Justice Landry is a 57 y.o. male with a pmhx significant for a recent hx of CVA (08/02/2018) with BL carotid stenosis of 49-93%, diastolic CHF, HTN, HLD, and obesity. He was seen at [facility]. He presented with left facial weakness, left facial numbness, and uncontrolled HTN. He was discharged to follow up with a neurosurgeon. He now presents to the hospital with complaint of BL hand numbness. He was initiated on ASA, statin, and antihypertensive at [facility]. A CTA of the head and neck is pending after pre-medication for a contrast allergy. Per neurology his MRI is + for microinfarction scattered throughout the right hemisphere indicating possible embolic disease. There is no evidence of vegetation on recent ECHO. Multiple EKGs confirm SR. .6 with Tchol of 173. TSH is not available for review. Repeat MRI is pending. We have been asked to evaluate his carotid stenosis. Past Medical History  Grade I diastolic CHF per ECHO 70/67/6236  HTN  HLD  CVA -microinfarction scattered throughout the right hemisphere indicating possible embolic disease   Pre-diabetic HA1c 6.3 (8/8/18)  Obesity    Past Surgical History:   Procedure Laterality Date    HX CHOLECYSTECTOMY       Family History   Problem Relation Age of Onset    Alzheimer Mother     Heart Disease Father     Diabetes Brother       Social History   Substance Use Topics    Smoking status: Never Smoker    Smokeless tobacco: Current User    Alcohol use Yes       He is a  and is routinely independent of his ADLs and IDLs. Prior to Admission medications    Medication Sig Start Date End Date Taking? Authorizing Provider   aspirin 81 mg chewable tablet Take 1 Tab by mouth daily. 8/5/18   Juliana Gonzales NP   atorvastatin (LIPITOR) 40 mg tablet Take 1 Tab by mouth nightly.  8/4/18   Juliana Gonzales NP   lisinopril (PRINIVIL, ZESTRIL) 5 mg tablet Take 1 Tab by mouth daily. 8/5/18   Jacalexn Olive., NP   metoprolol tartrate (LOPRESSOR) 25 mg tablet Take 0.5 Tabs by mouth two (2) times a day. 8/4/18   Jaken [place]., NP   colestipol (COLESTID) 1 gram tablet Take 2 Tabs by mouth daily. 8/4/18   Jaken Olive., NP     Allergies   Allergen Reactions    Iodine Hives      Review of Systems   Constitutional: Negative for activity change, appetite change, chills, fatigue and fever. HENT: Positive for mouth sores. Negative for drooling, trouble swallowing and voice change. Eyes: Negative for visual disturbance. Respiratory: Negative for cough, chest tightness and shortness of breath. Cardiovascular: Negative for chest pain and leg swelling. Gastrointestinal: Negative for abdominal pain, diarrhea, nausea and vomiting. Endocrine: Negative for polydipsia and polyuria. Genitourinary: Negative. Musculoskeletal: Negative for gait problem. Skin: Negative. Allergic/Immunologic: Negative. Neurological: Negative for weakness. Hematological: Negative. Psychiatric/Behavioral: Negative. Objective:       Patient Vitals for the past 24 hrs:   BP Temp Pulse Resp SpO2 Height Weight   08/08/18 0731 161/74 97.7 °F (36.5 °C) 81 18 98 % - -   08/08/18 0317 125/55 97.6 °F (36.4 °C) (!) 58 18 96 % - -   08/07/18 2337 148/52 98.4 °F (36.9 °C) 68 20 96 % - -   08/07/18 1757 140/59 98.2 °F (36.8 °C) 66 20 98 % - -   08/07/18 1700 150/71 98.3 °F (36.8 °C) 65 15 98 % - -   08/07/18 1600 157/67 - - - 98 % - -   08/07/18 1530 130/59 - - - 98 % - -   08/07/18 1500 148/55 - - - 96 % - -   08/07/18 1300 186/60 - - - 98 % - -   08/07/18 1059 143/63 98.3 °F (36.8 °C) (!) 58 16 98 % 6' (1.829 m) 115.8 kg (255 lb 4.7 oz)     Physical Exam   Constitutional: He is oriented to person, place, and time. He appears well-developed and well-nourished. HENT:   Head: Normocephalic and atraumatic. Eyes: EOM are normal. Pupils are equal, round, and reactive to light. Neck: Normal range of motion. Neck supple. Cardiovascular: Normal rate and regular rhythm. Pulmonary/Chest: Effort normal. No respiratory distress. Abdominal: Soft. He exhibits no distension. Musculoskeletal: Normal range of motion. Neurological: He is alert and oriented to person, place, and time. Skin: Skin is warm and dry. Psychiatric: His behavior is normal. Judgment and thought content normal.       Pertinent Test Results:   Recent Results (from the past 24 hour(s))   CBC WITH AUTOMATED DIFF    Collection Time: 08/07/18 11:20 AM   Result Value Ref Range    WBC 9.8 4.1 - 11.1 K/uL    RBC 4.71 4.10 - 5.70 M/uL    HGB 14.6 12.1 - 17.0 g/dL    HCT 42.8 36.6 - 50.3 %    MCV 90.9 80.0 - 99.0 FL    MCH 31.0 26.0 - 34.0 PG    MCHC 34.1 30.0 - 36.5 g/dL    RDW 12.1 11.5 - 14.5 %    PLATELET 736 740 - 461 K/uL    MPV 10.0 8.9 - 12.9 FL    NRBC 0.0 0  WBC    ABSOLUTE NRBC 0.00 0.00 - 0.01 K/uL    NEUTROPHILS 66 32 - 75 %    LYMPHOCYTES 22 12 - 49 %    MONOCYTES 7 5 - 13 %    EOSINOPHILS 5 0 - 7 %    BASOPHILS 1 0 - 1 %    IMMATURE GRANULOCYTES 0 0.0 - 0.5 %    ABS. NEUTROPHILS 6.5 1.8 - 8.0 K/UL    ABS. LYMPHOCYTES 2.1 0.8 - 3.5 K/UL    ABS. MONOCYTES 0.6 0.0 - 1.0 K/UL    ABS. EOSINOPHILS 0.5 (H) 0.0 - 0.4 K/UL    ABS. BASOPHILS 0.1 0.0 - 0.1 K/UL    ABS. IMM.  GRANS. 0.0 0.00 - 0.04 K/UL    DF AUTOMATED     PROTHROMBIN TIME + INR    Collection Time: 08/07/18 11:20 AM   Result Value Ref Range    INR 1.0 0.9 - 1.1      Prothrombin time 10.0 9.0 - 85.1 sec   METABOLIC PANEL, COMPREHENSIVE    Collection Time: 08/07/18 11:20 AM   Result Value Ref Range    Sodium 139 136 - 145 mmol/L    Potassium 4.3 3.5 - 5.1 mmol/L    Chloride 103 97 - 108 mmol/L    CO2 28 21 - 32 mmol/L    Anion gap 8 5 - 15 mmol/L    Glucose 88 65 - 100 mg/dL    BUN 14 6 - 20 MG/DL    Creatinine 0.99 0.70 - 1.30 MG/DL    BUN/Creatinine ratio 14 12 - 20      GFR est AA >60 >60 ml/min/1.73m2    GFR est non-AA >60 >60 ml/min/1.73m2 Calcium 8.7 8.5 - 10.1 MG/DL    Bilirubin, total 1.0 0.2 - 1.0 MG/DL    ALT (SGPT) 33 12 - 78 U/L    AST (SGOT) 16 15 - 37 U/L    Alk.  phosphatase 86 45 - 117 U/L    Protein, total 8.0 6.4 - 8.2 g/dL    Albumin 3.8 3.5 - 5.0 g/dL    Globulin 4.2 (H) 2.0 - 4.0 g/dL    A-G Ratio 0.9 (L) 1.1 - 2.2     URINALYSIS W/ RFLX MICROSCOPIC    Collection Time: 08/07/18 11:20 AM   Result Value Ref Range    Color YELLOW/STRAW      Appearance CLEAR CLEAR      Specific gravity 1.016 1.003 - 1.030      pH (UA) 7.0 5.0 - 8.0      Protein NEGATIVE  NEG mg/dL    Glucose NEGATIVE  NEG mg/dL    Ketone NEGATIVE  NEG mg/dL    Bilirubin NEGATIVE  NEG      Blood NEGATIVE  NEG      Urobilinogen 1.0 0.2 - 1.0 EU/dL    Nitrites NEGATIVE  NEG      Leukocyte Esterase NEGATIVE  NEG     MAGNESIUM    Collection Time: 08/07/18 11:20 AM   Result Value Ref Range    Magnesium 2.1 1.6 - 2.4 mg/dL   CK W/ CKMB & INDEX    Collection Time: 08/07/18 11:20 AM   Result Value Ref Range    CK 60 39 - 308 U/L    CK - MB <1.0 <3.6 NG/ML    CK-MB Index Cannot be calculated 0 - 2.5     TROPONIN I    Collection Time: 08/07/18 11:20 AM   Result Value Ref Range    Troponin-I, Qt. <0.05 <0.05 ng/mL   EKG, 12 LEAD, INITIAL    Collection Time: 08/07/18 11:43 AM   Result Value Ref Range    Ventricular Rate 58 BPM    Atrial Rate 58 BPM    P-R Interval 170 ms    QRS Duration 88 ms    Q-T Interval 392 ms    QTC Calculation (Bezet) 384 ms    Calculated P Axis 8 degrees    Calculated R Axis -10 degrees    Calculated T Axis 26 degrees    Diagnosis       Sinus bradycardia  When compared with ECG of 02-AUG-2018 21:55,  No significant change was found  Confirmed by Rebecca English ([id number]) on 8/7/2018 4:49:44 PM     GLUCOSE, POC    Collection Time: 08/07/18 11:55 AM   Result Value Ref Range    Glucose (POC) 99 65 - 100 mg/dL    Performed by Debborah Paddy (PCT)    POC INR    Collection Time: 08/07/18 11:56 AM   Result Value Ref Range    INR (POC) 1.0 <1.2     GLUCOSE, POC Collection Time: 08/07/18  9:41 PM   Result Value Ref Range    Glucose (POC) 167 (H) 65 - 100 mg/dL    Performed by Olaf Lopes (PCT)    SED RATE (ESR)    Collection Time: 08/08/18  3:16 AM   Result Value Ref Range    Sed rate, automated 17 0 - 20 mm/hr   HEMOGLOBIN A1C WITH EAG    Collection Time: 08/08/18  3:16 AM   Result Value Ref Range    Hemoglobin A1c 6.3 4.2 - 6.3 %    Est. average glucose 134 mg/dL   GLUCOSE, POC    Collection Time: 08/08/18  7:12 AM   Result Value Ref Range    Glucose (POC) 163 (H) 65 - 100 mg/dL    Performed by Olaf Lopes (PCT)          Results from [place] encounter on 08/02/18   XR CHEST PORT   Narrative EXAM:  XR CHEST PORT    INDICATION:  TIA. C/o left sided HA x 3 days- today left upper lip numbness  lasting \"few mins\" - resolved now- visitor states he had left sided facial droop  also    COMPARISON:  None. FINDINGS: A portable AP radiograph of the chest was obtained at 18:56 hours. The lungs are clear. The cardiac and mediastinal contours and pulmonary  vascularity are normal.  The chest wall structures and visualized upper abdomen  show no acute findings with incidental note of degenerative spine and shoulder  changes. Impression IMPRESSION: No acute findings. Assessmen/Plan:     Consult problem:  Bilateral carotid stenosis with hx of CVA  -microinfarction scattered throughout the right hemisphere indicating possible embolic disease   -repeat MRI Is pending. Dr. Manda Tate to evaluate later today once CTA is resulted. Active problems:  Grade I chronic diastolic CHF   -currently compensated   Uncontrolled HTN   Uncontrolled HLD  -   Pre-diabetic   -HA1c 6.3 (8/8/18)  Obesity  Management of comorbid conditions by primary team.    VTE prophylaxis  SCDs  Patient is independent and OOB.     Disposition  Home         Signed By: Pasquale Ricci NP     August 8, 2018

## 2018-08-08 NOTE — PROGRESS NOTES
Bedside and Verbal shift change report given to Paul Kahn RN(oncoming nurse) by Fan Hankins RN (offgoing nurse). Report included the following information SBAR, Kardex, Recent Results and Med Rec Status.     Zone Phone:   [phone]        Significant changes during shift:  None           Patient Information     Gina Montes  57 y.o.  8/7/2018 10:59 AM by Celina Aldana MD. Gina Montes was admitted from Home     Problem List          Patient Active Problem List     Diagnosis Date Noted    Stroke (cerebrum) ([facility] [address] 08/07/2018    Bilateral carpal tunnel syndrome 08/07/2018    Transient ischemic attack involving right internal carotid artery 08/07/2018    Bilateral carotid artery stenosis 08/07/2018    TIA (transient ischemic attack) 08/02/2018    Hemiplegic migraine 08/02/2018    Essential hypertension 08/02/2018    Tobacco abuse 08/02/2018      Past Medical History:   Diagnosis Date    Bilateral carotid artery stenosis       880-99%    Stroke (cerebrum) ([facility] [address] 08/02/2018     multiple embolic stroke on right coronal radiata and right parietal            Core Measures:     CVA: Yes Yes  CHF:No No  PNA:No No     Activity Status:     OOB to Chair Yes  Ambulated this shift Yes   Bed Rest No     Supplemental O2: (If Applicable)     NC No  NRB No  Venti-mask No  On  Liters/min        LINES AND DRAINS:     Central Line? No Placement date  Reason Medically Necessary      PICC LINE? No Placement date Reason Medically Necessary      Urinary Catheter? No Placement Date  Reason Medically Necessary      DVT prophylaxis:     DVT prophylaxis Med- Yes  DVT prophylaxis SCD or CHRIS- No      Wounds: (If Applicable)     Wounds- No     Location      Patient Safety:     Falls Score Total Score: 0  Safety Level_______  Bed Alarm On? No  Sitter?  No     Plan for upcoming shift: Monitor, Review test results, plan surgery        Discharge Plan: No      Active Consults:  IP CONSULT TO NEUROLOGY

## 2018-08-08 NOTE — PROGRESS NOTES
High grade bilat ICA lesions with extensive soft thrombus on right  MRI evidence of micro infarcts on right  Currently asymptomatic and back to baseline  Needs staged bilat CEAs R=>L    He declined return to his home in [place], he feels he will get more support from his co-workers here than family in [place]. He would like us to perform surgery. Will discuss timing with Dr Miriam Thornton - I am inclined to intervene early.

## 2018-08-08 NOTE — PROGRESS NOTES
Pt is a 57 y.o male admitted with Stroke. Pt was alert, oriented in no distress. Demographic information verified and all is correct. Pt is from [place], [place] and is here in [place] on a work assignment in [place], [place]. Pt works for [employer] and will be here for another 4-5 wks. Pt currently living with work friends in a 1 story home with 3 steps to the entrance in [place], [place]. Prior to admission, pt was independent with ADL's and IADL's and driving. Denies using DME. Preferred pharmacy is [facility]. Pt's work friends can transport pt home at discharge. Pt's has a PCP in [place], [place] and will go to [facility] or a urgent care center in [place] for a PCP f/u. CM will continue to follow pt for discharge planning needs. Reason for Admission:   Stroke                   RRAT Score:       7              Plan for utilizing home health:      no                    Likelihood of Readmission:  low                         Transition of Care Plan:          Home with f/u appts    Care Management Interventions  PCP Verified by CM: Yes (PCP in [facility])  Mode of Transport at Discharge:  Other (see comment) (pt's friends can transport by car)  Transition of Care Consult (CM Consult): Discharge Planning  Discharge Durable Medical Equipment: No  Physical Therapy Consult: Yes  Occupational Therapy Consult: Yes  Speech Therapy Consult: Yes  Current Support Network: Own Home, Other (lives with work friends in a 1 story home with 3 steps to the entrance)  Confirm Follow Up Transport: Friends  Discharge Location  Discharge Placement: [address] [place] [address]

## 2018-08-09 ENCOUNTER — ANESTHESIA EVENT (OUTPATIENT)
Dept: SURGERY | Age: 63
DRG: 038 | End: 2018-08-09
Payer: COMMERCIAL

## 2018-08-09 LAB
ANA SER QL: POSITIVE
ANTICHROMATIN ABS, ACHRLT: <0.2 AI (ref 0–0.9)
DSDNA AB SER-ACNC: 1 IU/ML (ref 0–9)
ENA SM AB SER-ACNC: <0.2 AI (ref 0–0.9)
ENA SM+RNP AB SER-ACNC: <0.2 AI (ref 0–0.9)
ENA SS-A AB SER-ACNC: 6.9 AI (ref 0–0.9)
GLUCOSE BLD STRIP.AUTO-MCNC: 112 MG/DL (ref 65–100)
GLUCOSE BLD STRIP.AUTO-MCNC: 112 MG/DL (ref 65–100)
GLUCOSE BLD STRIP.AUTO-MCNC: 113 MG/DL (ref 65–100)
GLUCOSE BLD STRIP.AUTO-MCNC: 130 MG/DL (ref 65–100)
RNP ABS, RNPRLT: <0.2 AI (ref 0–0.9)
SEE BELOW:, 164879: ABNORMAL
SERVICE CMNT-IMP: ABNORMAL

## 2018-08-09 PROCEDURE — 74011250636 HC RX REV CODE- 250/636: Performed by: SURGERY

## 2018-08-09 PROCEDURE — 65660000000 HC RM CCU STEPDOWN

## 2018-08-09 PROCEDURE — 74011250637 HC RX REV CODE- 250/637: Performed by: SURGERY

## 2018-08-09 PROCEDURE — 82962 GLUCOSE BLOOD TEST: CPT

## 2018-08-09 PROCEDURE — 74011250637 HC RX REV CODE- 250/637: Performed by: HOSPITALIST

## 2018-08-09 RX ORDER — CEFAZOLIN SODIUM/WATER 2 G/20 ML
2 SYRINGE (ML) INTRAVENOUS
Status: COMPLETED | OUTPATIENT
Start: 2018-08-09 | End: 2018-08-10

## 2018-08-09 RX ADMIN — DOCUSATE SODIUM 100 MG: 100 CAPSULE, LIQUID FILLED ORAL at 17:19

## 2018-08-09 RX ADMIN — CLOPIDOGREL BISULFATE 75 MG: 75 TABLET ORAL at 08:55

## 2018-08-09 RX ADMIN — COLESTIPOL HYDROCHLORIDE 2 G: 1 TABLET, FILM COATED ORAL at 08:55

## 2018-08-09 RX ADMIN — ASPIRIN 81 MG: 81 TABLET ORAL at 08:56

## 2018-08-09 RX ADMIN — DOCUSATE SODIUM 100 MG: 100 CAPSULE, LIQUID FILLED ORAL at 08:56

## 2018-08-09 RX ADMIN — Medication 10 ML: at 21:35

## 2018-08-09 RX ADMIN — METOPROLOL TARTRATE 12.5 MG: 25 TABLET ORAL at 08:56

## 2018-08-09 RX ADMIN — METOPROLOL TARTRATE 12.5 MG: 25 TABLET ORAL at 17:20

## 2018-08-09 RX ADMIN — ATORVASTATIN CALCIUM 40 MG: 40 TABLET, FILM COATED ORAL at 21:35

## 2018-08-09 RX ADMIN — ENOXAPARIN SODIUM 40 MG: 40 INJECTION SUBCUTANEOUS at 15:05

## 2018-08-09 RX ADMIN — Medication 10 ML: at 15:05

## 2018-08-09 RX ADMIN — Medication 10 ML: at 03:14

## 2018-08-09 NOTE — PROGRESS NOTES
Neurology Progress Note    Patient ID:  Meghana Hernandez  [id number]  57 y.o.  1955      CHIEF COMPLAINT: Left-sided weakness and numbness both hands    Subjective:      Patient has complaints of left-sided weakness that occurred several days ago, but that has now resolved completely, and he was admitted because of recurring numbness of both hands pretty clearly this looks like carpal tunnel syndrome. Patient was admitted to [facility], and carotid Dopplers that showed 80-90% disease, and he had a clear left hemiparesis associated with a TIA that was symptomatic from the right carotid, and an MRI scan today shows only one small punctate area of ischemia left and the patient clinically is completely back to normal.  His CTA shows 95% stenosis of the right internal carotid artery with a soft thrombus probably there and plaque, and I discussed with Dr. Hayden Bullock, and I agree he needs surgery urgently because he is a high risk for progression of disease with a stroke in major disability and perhaps morbidity if he goes untreated. [place] and we do not know about his follow-up. Because of his severe stenosis and his young age even on the left side, he probably needs that done after the right side been done. His CTA, his MRI scan and MRA all reviewed personally on the PACS system and I agree with reports as dictated.     Current Facility-Administered Medications   Medication Dose Route Frequency    insulin lispro (HUMALOG) injection   SubCUTAneous AC&HS    glucose chewable tablet 16 g  4 Tab Oral PRN    dextrose (D50W) injection syrg 12.5-25 g  12.5-25 g IntraVENous PRN    glucagon (GLUCAGEN) injection 1 mg  1 mg IntraMUSCular PRN    clopidogrel (PLAVIX) tablet 75 mg  75 mg Oral DAILY    enoxaparin (LOVENOX) injection 40 mg  40 mg SubCUTAneous Q24H    sodium chloride (NS) flush 5-10 mL  5-10 mL IntraVENous Q8H    sodium chloride (NS) flush 5-10 mL  5-10 mL IntraVENous PRN    acetaminophen (TYLENOL) tablet 650 mg  650 mg Oral Q4H PRN    Or    acetaminophen (TYLENOL) solution 650 mg  650 mg Per NG tube Q4H PRN    Or    acetaminophen (TYLENOL) suppository 650 mg  650 mg Rectal Q4H PRN    ondansetron (ZOFRAN) injection 4 mg  4 mg IntraVENous Q6H PRN    aspirin delayed-release tablet 81 mg  81 mg Oral DAILY    docusate sodium (COLACE) capsule 100 mg  100 mg Oral BID    LORazepam (ATIVAN) injection 1 mg  1 mg IntraVENous PRN    atorvastatin (LIPITOR) tablet 40 mg  40 mg Oral QHS    colestipol (COLESTID) tablet 2 g  2 g Oral DAILY    metoprolol tartrate (LOPRESSOR) tablet 12.5 mg  12.5 mg Oral BID    hydrALAZINE (APRESOLINE) 20 mg/mL injection 10 mg  10 mg IntraVENous Q2H PRN        Past Medical History:   Diagnosis Date    Bilateral carotid artery stenosis     880-99%    Stroke (cerebrum) (HCC) 08/02/2018    multiple embolic stroke on right coronal radiata and right parietal       Past Surgical History:   Procedure Laterality Date    HX CHOLECYSTECTOMY         [unfilled]    Social History   Substance Use Topics    Smoking status: Never Smoker    Smokeless tobacco: Current User    Alcohol use Yes       Current Facility-Administered Medications   Medication Dose Route Frequency Provider Last Rate Last Dose    insulin lispro (HUMALOG) injection   SubCUTAneous AC&HS Brandy Dent MD   Stopped at 08/08/18 2200    glucose chewable tablet 16 g  4 Tab Oral PRN Brandy Dent MD        dextrose (D50W) injection syrg 12.5-25 g  12.5-25 g IntraVENous PRN Brandy Dent MD        glucagon (GLUCAGEN) injection 1 mg  1 mg IntraMUSCular PRN Brandy Dent MD        clopidogrel (PLAVIX) tablet 75 mg  75 mg Oral DAILY Frida Santiago MD   75 mg at 08/08/18 1306    enoxaparin (LOVENOX) injection 40 mg  40 mg SubCUTAneous Q24H Frida Santiago MD   40 mg at 08/08/18 1306    sodium chloride (NS) flush 5-10 mL  5-10 mL IntraVENous Sandra Coreas MD   10 mL at 08/08/18 2125    sodium chloride (NS) flush 5-10 mL  5-10 mL IntraVENous PRN Chuy Herrera MD   10 mL at 08/08/18 0319    acetaminophen (TYLENOL) tablet 650 mg  650 mg Oral Q4H PRN Chuy Herrera MD        Or   [facility] acetaminophen (TYLENOL) solution 650 mg  650 mg Per NG tube Q4H PRN Chuy Herrera MD        Or   [facility] acetaminophen (TYLENOL) suppository 650 mg  650 mg Rectal Q4H PRN Chuy Herrera MD        ondansetron [facility]) injection 4 mg  4 mg IntraVENous Q6H PRN Chuy Herrera MD        aspirin delayed-release tablet 81 mg  81 mg Oral DAILY Chuy Herrera MD   81 mg at 08/08/18 5025    docusate sodium (COLACE) capsule 100 mg  100 mg Oral BID Chuy Herrera MD   100 mg at 08/08/18 1738    LORazepam (ATIVAN) injection 1 mg  1 mg IntraVENous PRN Chuy Herrera MD        atorvastatin (LIPITOR) tablet 40 mg  40 mg Oral QHS Chuy Herrera MD   40 mg at 08/08/18 2124    colestipol (COLESTID) tablet 2 g  2 g Oral DAILY Chuy Herrera MD   2 g at 08/08/18 0919    metoprolol tartrate (LOPRESSOR) tablet 12.5 mg  12.5 mg Oral BID Chuy Herrera MD   12.5 mg at 08/08/18 1738    hydrALAZINE (APRESOLINE) 20 mg/mL injection 10 mg  10 mg IntraVENous Q2H PRN Chuy Herrera MD           Allergies   Allergen Reactions    Iodine Hives       Review of Systems:    A comprehensive review of systems was negative except for: Neurological: positive for paresthesia and History of TIA and numbness in his hand suggesting carpal tunnel    Objective:      Objective:     Patient Vitals for the past 24 hrs:   BP Temp Pulse Resp SpO2   08/08/18 2038 161/69 97.7 °F (36.5 °C) 73 18 99 %   08/08/18 1600 165/65 98.5 °F (36.9 °C) 77 18 97 %   08/08/18 1138 149/63 97.7 °F (36.5 °C) 78 18 93 %   08/08/18 0731 161/74 97.7 °F (36.5 °C) 81 18 98 %   08/08/18 0317 125/55 97.6 °F (36.4 °C) (!) 58 18 96 %   08/07/18 2337 148/52 98.4 °F (36.9 °C) 68 20 96 %         Lab Review   Recent Results (from the past 24 hour(s))   SED RATE (ESR)    Collection Time: 08/08/18  3:16 AM Result Value Ref Range    Sed rate, automated 17 0 - 20 mm/hr   HEMOGLOBIN A1C WITH EAG    Collection Time: 08/08/18  3:16 AM   Result Value Ref Range    Hemoglobin A1c 6.3 4.2 - 6.3 %    Est. average glucose 134 mg/dL   GLUCOSE, POC    Collection Time: 08/08/18  7:12 AM   Result Value Ref Range    Glucose (POC) 163 (H) 65 - 100 mg/dL    Performed by Sue Meneses (PCT)    GLUCOSE, POC    Collection Time: 08/08/18 10:59 AM   Result Value Ref Range    Glucose (POC) 206 (H) 65 - 100 mg/dL    Performed by Angie Braga (PCT)    GLUCOSE, POC    Collection Time: 08/08/18  4:33 PM   Result Value Ref Range    Glucose (POC) 206 (H) 65 - 100 mg/dL    Performed by Mary Toro    GLUCOSE, POC    Collection Time: 08/08/18  9:05 PM   Result Value Ref Range    Glucose (POC) 164 (H) 65 - 100 mg/dL    Performed by Jeimy Ho (PCT)            Additional comments:I personally viewed and interpreted the patient's CTA, MRI and MRAs all reviewed personally on the PACS system    NEUROLOGICAL EXAM:     Appearance: The patient is well developed, well nourished, provides a coherent history and is in no acute distress. Mental Status: Oriented to time, place and person, and the president, cognitive function is normal and speech is fluent and no aphasia or dysarthria. Mood and affect appropriate. Cranial Nerves:   Intact visual fields. Fundi are benign. DIAN, EOM's full, no nystagmus, no ptosis. Facial sensation is normal. Corneal reflexes are not tested. Facial movement is symmetric. Hearing is normal bilaterally. Palate is midline with normal sternocleidomastoid and trapezius muscles are normal. Tongue is midline. Neck without meningismus or bruits  Temporal arteries are not tender or enlarged   Motor:  5/5 strength in upper and lower proximal and distal muscles. Normal bulk and tone. No fasciculations.    Reflexes:   Deep tendon reflexes 2+/4 and symmetrical.  No babinski or clonus present  He has Tinel's over both median nerves at the wrists   Sensory:   Normal to touch, pinprick and vibration. DSS is intact   Gait:  Normal gait. Tremor:   No tremor noted. Cerebellar:  No cerebellar signs present. Neurovascular:  Normal heart sounds and regular rhythm, peripheral pulses decreased, and no carotid bruits.                    Assessment:        Assessment:       ICD-10-CM ICD-9-CM    1. Bilateral carotid artery stenosis I65.23 433.10      433.30    2. Bilateral carpal tunnel syndrome G56.03 354.0    3. Cerebrovascular accident (CVA), unspecified mechanism ([facility] [address] I63.9 434.91    4. Transient ischemic attack involving right internal carotid artery G45.1 435.8    5. Transient cerebral ischemia, unspecified type G45.9 435.9      Active Problems:    Stroke (cerebrum) (HCC) (8/7/2018)      Bilateral carpal tunnel syndrome (8/7/2018)      Transient ischemic attack involving right internal carotid artery (8/7/2018)      Bilateral carotid artery stenosis (8/7/2018)        Plan:     Patient has complaints of left-sided weakness that occurred several days ago, but that has now resolved completely, and he was admitted because of recurring numbness of both hands pretty clearly this looks like carpal tunnel syndrome. Patient was admitted to [facility], and carotid Dopplers that showed 80-90% disease, and he had a clear left hemiparesis associated with a TIA that was symptomatic from the right carotid, and an MRI scan today shows only one small punctate area of ischemia left and the patient clinically is completely back to normal.  His CTA shows 95% stenosis of the right internal carotid artery with a soft thrombus probably there and plaque, and I discussed with Dr. Navya Fraser, and I agree he needs surgery urgently because he is a high risk for progression of disease with a stroke in major disability and perhaps morbidity if he goes untreated. [place] and we do not know about his follow-up.   Because of his severe stenosis and his young age even on the left side, he probably needs that done after the right side been done. His CTA, his MRI scan and MRA all reviewed personally on the PACS system and I agree with reports as dictated.   Surgery in the next day or 2 if they can be arranged with vascular surgeon      Signed:  Samantha Caceres MD  8/8/2018  10:23 PM    None  None

## 2018-08-09 NOTE — PROGRESS NOTES
Hospitalist Progress Note    NAME: Yoon Vann   :  1955   MRN:  [id number]     This patient is at above high risk of deterioration based on documented presenting clinical data, comorbid conditions, high risk of adverse events and current acute care course. Assessment / Plan:  TIA due to recurrent stroke due to Severe bilateral carotid stenoses, POA  Recent embolic stroke right corona radiata and right parietal lobe 8/3/18  -symptoms nearly resolved  -for surgery tomorrow with Vascular surgery  -continue with pt/ot  -lipitor continues  -bp control with lopressor. bp stable     HTN  -lopressor only for now - consider restarting acei once decision on surgery noted     Hyperlipidemia  --continue statin started last week      Tobacco abuse  --chew snuff educated cessation - he will try     Obesity  Body mass index is 34.62 kg/(m^2). Borderline diabetes  -a1c 6.3, random bs 160-200. Will continue to track for now - he is interested in diet and exercise. DTC to see for OP education     Code:  full  DVT prophylaxis: SCD  Surrogate decision maker:  Has daughter Brenda Mann in [address] [place] [address] 153. Emergency contact friend Mercy Naranjo [phone]    Code status: Full  Prophylaxis: SCD's  Recommended Disposition: Home w/Family, SNF/LTC and  PT, OT, RN     Medical Decision Making Today  · Acute or chronic illness that poses a threat to life or bodily function  · I have reviewed the flowsheet and previous days notes  · One or more chronic illnesses with severe exacerbation, progression or side effects of treatment  · Review and order of Clinical lab tests  · Discuss case with Specialist MD    Subjective:     Chief Complaint / Reason for Physician Visit  \"i feel great\". Discussed with RN events overnight. No symptoms today. No ha/change vision.     Review of Systems:  Symptom Y/N Comments  Symptom Y/N Comments   Fever/Chills n   Chest Pain n    Poor Appetite n   Edema     Cough    Abdominal Pain n    Sputum n Joint Pain     SOB/COATS n   Pruritis/Rash     Nausea/vomit n   Tolerating PT/OT     Diarrhea    Tolerating Diet y    Constipation n   Other       Could NOT obtain due to:      Objective:     VITALS:   Last 24hrs VS reviewed since prior progress note. Most recent are:  Patient Vitals for the past 24 hrs:   Temp Pulse Resp BP SpO2   08/09/18 0748 97.7 °F (36.5 °C) 66 18 130/59 98 %   08/09/18 0315 97.6 °F (36.4 °C) 62 18 142/66 98 %   08/08/18 2347 97.5 °F (36.4 °C) 65 18 151/64 99 %   08/08/18 2038 97.7 °F (36.5 °C) 73 18 161/69 99 %   08/08/18 1600 98.5 °F (36.9 °C) 77 18 165/65 97 %   08/08/18 1138 97.7 °F (36.5 °C) 78 18 149/63 93 %     No intake or output data in the 24 hours ending 08/09/18 0849     PHYSICAL EXAM:  General: WD, obese m sitting up in bed, interactive, Alert, cooperative, no acute distress    EENT:  EOMI. Anicteric sclerae. MM m  Resp:  CTA bilaterally, no wheezing or rales. No accessory muscle use  CV:  2/6 thierry. Regular  rhythm,  No edema  GI:  Soft, Non distended, Non tender.  +Bowel sounds  Neurologic:  Alert and oriented X 3, normal speech  Psych:   Good insight. Not anxious nor agitated  Skin:  no rashes or ulcers. No jaundice    Reviewed most current lab test results and cultures  YES  Reviewed most current radiology test results   YES  Review and summation of old records today    NO  Reviewed patient's current orders and MAR    YES  PMH/ reviewed - no change compared to H&P  ________________________________________________________________________  Care Plan discussed with:    Comments   Patient x Discussed with patient in room. For surgeyr tomorrow with Dr Debbie Conde. Questions answered. 7   Family      RN x    Care Manager     Consultant: x Dr Lockwood Masters 7                    x Multidiciplinary team rounds were held today with , nursing, pharmacist and clinical coordinator. Patient's plan of care was discussed; medications were reviewed and discharge planning was addressed.  5 ________________________________________________________________________  Total NON critical care TIME:  25   Minutes    Total CRITICAL CARE TIME Spent:   Minutes non procedure based. I have provided critical care time. During this entire length of time I was immediately available to the patient. The reason for providing this level of medical care was due to a critical illness that impaired one or more vital organ systems, such that there was a high probability of imminent or life threatening deterioration in the patient's condition. This care involved high complexity decision making which includes reviewing the patient's past medical records, current laboratory results, and actual Xray films in order to assess, support vital system function, and to treat this degree of vital organ system failure, and to prevent further life threatening deterioration of the patients condition. Comments   >50% of visit spent in counseling and coordination of care x See above   ________________________________________________________________________  Procedures: see electronic medical records for all procedures/Xrays and details which were not copied into this note but were reviewed prior to creation of Plan. LABS:  Recent Labs      08/07/18   1120   WBC  9.8   HGB  14.6   HCT  42.8   PLT  286     Recent Labs      08/07/18   1120   NA  139   K  4.3   CL  103   CO2  28   BUN  14   CREA  0.99   GLU  88   CA  8.7   MG  2.1     Recent Labs      08/07/18   1120   SGOT  16   ALT  33   AP  86   TBILI  1.0   TP  8.0   ALB  3.8   GLOB  4.2*     Recent Labs      08/07/18   1156  08/07/18   1120   INR  1.0  1.0   PTP   --   10.0      No results for input(s): FE, TIBC, PSAT, FERR in the last 72 hours. No results found for: FOL, RBCF   No results for input(s): PH, PCO2, PO2 in the last 72 hours. No results for input(s): PHI, PO2I, PCO2I in the last 72 hours.   Recent Labs      08/07/18   1120   CPK  60   CKNDX  Cannot be calculated   TROIQ  <0.05     Lab Results   Component Value Date/Time    Cholesterol, total 173 08/03/2018 05:40 AM    HDL Cholesterol 38 08/03/2018 05:40 AM    LDL, calculated 107.6 (H) 08/03/2018 05:40 AM    Triglyceride 137 08/03/2018 05:40 AM    CHOL/HDL Ratio 4.6 08/03/2018 05:40 AM     Lab Results   Component Value Date/Time    Glucose (POC) 112 (H) 08/09/2018 06:35 AM    Glucose (POC) 164 (H) 08/08/2018 09:05 PM    Glucose (POC) 206 (H) 08/08/2018 04:33 PM    Glucose (POC) 206 (H) 08/08/2018 10:59 AM    Glucose (POC) 163 (H) 08/08/2018 07:12 AM     Lab Results   Component Value Date/Time    Color YELLOW/STRAW 08/07/2018 11:20 AM    Appearance CLEAR 08/07/2018 11:20 AM    Specific gravity 1.016 08/07/2018 11:20 AM    pH (UA) 7.0 08/07/2018 11:20 AM    Protein NEGATIVE  08/07/2018 11:20 AM    Glucose NEGATIVE  08/07/2018 11:20 AM    Ketone NEGATIVE  08/07/2018 11:20 AM    Bilirubin NEGATIVE  08/07/2018 11:20 AM    Urobilinogen 1.0 08/07/2018 11:20 AM    Nitrites NEGATIVE  08/07/2018 11:20 AM    Leukocyte Esterase NEGATIVE  08/07/2018 11:20 AM       RADIOGRAPHIC STUDIES:  CXR Results  (Last 48 hours)    None          CT Results  (Last 48 hours)               08/08/18 0815  CTA HEAD NECK W CONT Final result    Impression:  IMPRESSION:         Moderate to severe hemodynamically significant stenoses in the proximal internal   carotid arteries on the right and on the left. Greater than 80% stenosis present   on the right and on the left with soft mural plaque at the origin of the   internal carotid arteries. There is moderate atherosclerotic plaque of the   common carotid vessels as well. .       These findings were related to Dr. Cheikh Saha and Dr. Faith Palomino at approximately 11:30   AM on 8/8/2018               Narrative:  EXAM:  CTA HEAD NECK W CONT           HISTORY: Multiple CVAs on MRI 8/3/2018   INDICATION:   multiple strokes on mri 8/3, severe carotid stenosis on US       COMPARISON:  MRA 3/20/2018, CTA 2/20/2018. CONTRAST:  100 mL of Isovue-370. TECHNIQUE:  Unenhanced  images were obtained to localize the volume for   acquisition. Multislice helical axial CT angiography was performed from the   aortic arch to the top of the head during uneventful rapid bolus intravenous   contrast administration. Coronal and sagittal reformations and 3D post   processing was performed. CT dose reduction was achieved through use of a   standardized protocol tailored for this examination and automatic exposure   control for dose modulation. FINDINGS:       CTA NECK   There is no large pulmonary mass or nodule. 3 vessel aortic arch. Vertebral   artery origins within normal limits. Right vertebral artery slightly larger than   left vertebral artery. . There is extensive atherosclerotic change extending into   the proximal internal carotid arteries on the right and on the left. Nicolette Zacarias % of right carotid artery stenosis: 80%   % of left carotid artery stenosis: 80-90%   There is soft mural plaque in both the right and left internal carotid arteries   at their origins. There is atherosclerotic plaque in the common carotid vessels   distally as well. NASCET method was utilized for calculating stenosis. Right vertebral artery slightly larger than left vertebral artery. .  The   cervical soft tissues are unremarkable. Multilevel severe foraminal stenoses   largely related to significant facet hypertrophy. .       CTA HEAD   Mild stenosis in the distal left vertebral artery. A 2 segments are patent. A1   segments are patent. M1 segments are patent and demonstrate symmetric   arborization. Cavernous and petrous ICAs are patent. The left A2 segment is   somewhat diminutive in size. . The basilar artery and its branches are normal. A   2 segments are within normal limits. There are A1 segments bilaterally. . There   is no flow-limiting intracranial stenosis. Dural venous sinuses are patent. .   There are no sizable posterior communicating arteries. 08/07/18 1133  CT HEAD WO CONT Final result    Impression:  IMPRESSION: No acute process or change compared to the prior exam.               Narrative:  EXAM:  CT HEAD WO CONT       INDICATION:   Bilateral hand numbness since yesterday       COMPARISON: 8/2/2018. CONTRAST:  None. TECHNIQUE: Unenhanced CT of the head was performed using 5 mm images. Brain and   bone windows were generated. CT dose reduction was achieved through use of a   standardized protocol tailored for this examination and automatic exposure   control for dose modulation. FINDINGS:   The ventricles and sulci are normal in size, shape and configuration and   midline. There is no significant white matter disease. There is no intracranial   hemorrhage, extra-axial collection, mass, mass effect or midline shift. The   basilar cisterns are open. No acute infarct is identified. The bone windows   demonstrate no abnormalities. The visualized portions of the paranasal sinuses   and mastoid air cells are clear. Echo Results  (Last 48 hours)    None          VENOUS DOPPLER results  (Last 48 hours)    None          CULTURES:    No results found for: SDES No results found for: CULT       Signed: Jose Eduardo Jean-Baptiste MD    This note will not be viewable in [address]

## 2018-08-09 NOTE — PROGRESS NOTES
Bedside and Verbal shift change report given to DEEPAK fontanez(oncoming nurse) by Vanessa Paredes RN (offgoing nurse). Report included the following information SBAR, Kardex, Recent Results and Med Rec Status.      Zone Phone:   [phone]          Significant changes during shift:  None              Patient Information  Kika Kapoor  57 y.o.  8/7/2018 10:59 AM by Ashley Cho MD. Dilia Pemberton admitted from [facility]      Problem List              Patient Active Problem List      Diagnosis Date Noted    Stroke (cerebrum) ([facility] [place] [address] 08/07/2018    Bilateral carpal tunnel syndrome 08/07/2018    Transient ischemic attack involving right internal carotid artery 08/07/2018    Bilateral carotid artery stenosis 08/07/2018    TIA (transient ischemic attack) 08/02/2018    Hemiplegic migraine 08/02/2018    Essential hypertension 08/02/2018    Tobacco abuse 08/02/2018            Past Medical History:   Diagnosis Date    Bilateral carotid artery stenosis         880-99%    Stroke (cerebrum) ([facility] [place] [address] 08/02/2018      multiple embolic stroke on right coronal radiata and right parietal               Core Measures:      CVA: Yes Yes  CHF:No No  PNA:No No      Activity Status:      OOB to Chair Yes  Ambulated this shift Yes   Bed Rest No      Supplemental S0: ([place]ian Applicable)      NC No  NRB No  Venti-mask No  On  Liters/min          LINES AND DRAINS:      Central Line? No Placement date  Reason Medically Necessary       PICC LINE? No Placement date Reason Medically Necessary       Urinary Catheter? No Placement Date  Reason Medically Necessary       DVT prophylaxis:      DVT prophylaxis Med- Yes  DVT prophylaxis SCD or CHRIS- No       Wounds: (If Applicable)      Wounds- No      Location       Patient Safety:      Falls Score Total Score: 0  Safety Level_______  Bed Alarm On? No  Sitter?  No      Plan for upcoming shift: Monitor, Review test results, plan surgery          Discharge Plan: No       Active Consults:  IP CONSULT TO NEUROLOGY

## 2018-08-09 NOTE — PROGRESS NOTES
Bedside and Verbal shift change report given to Brent Hubbard RN(oncoming nurse) by Chaparrita Wilson RN (offgoing nurse). Report included the following information SBAR, Kardex, Recent Results and Med Rec Status.      Zone Phone:   [phone]          Significant changes during shift:  None              Patient Information  Rabia Mistry  57 y.o.  8/7/2018 10:59 AM by Cheng Weiss MD. Klaudia Renteria admitted from [facility]      Problem List              Patient Active Problem List      Diagnosis Date Noted    Stroke (cerebrum) ([facility] [address] 08/07/2018    Bilateral carpal tunnel syndrome 08/07/2018    Transient ischemic attack involving right internal carotid artery 08/07/2018    Bilateral carotid artery stenosis 08/07/2018    TIA (transient ischemic attack) 08/02/2018    Hemiplegic migraine 08/02/2018    Essential hypertension 08/02/2018    Tobacco abuse 08/02/2018            Past Medical History:   Diagnosis Date    Bilateral carotid artery stenosis         880-99%    Stroke (cerebrum) ([facility] [address] 08/02/2018      multiple embolic stroke on right coronal radiata and right parietal               Core Measures:      CVA: Yes Yes  CHF:No No  PNA:No No      Activity Status:      OOB to Chair Yes  Ambulated this shift Yes   Bed Rest No      Supplemental A9: (CS Applicable)      NC No  NRB No  Venti-mask No  On  Liters/min          LINES AND DRAINS:      Central Line? No Placement date  Reason Medically Necessary       PICC LINE? No Placement date Reason Medically Necessary       Urinary Catheter? No Placement Date  Reason Medically Necessary       DVT prophylaxis:      DVT prophylaxis Med- Yes  DVT prophylaxis SCD or CHRIS- No       Wounds: (If Applicable)      Wounds- No      Location       Patient Safety:      Falls Score Total Score: 0  Safety Level_______  Bed Alarm On? No  Sitter?  No      Plan for upcoming shift: Monitor, Review test results, Surgery in the AM          Discharge Plan: No       Active Consults:  IP CONSULT TO NEUROLOGY

## 2018-08-10 ENCOUNTER — ANESTHESIA (OUTPATIENT)
Dept: SURGERY | Age: 63
DRG: 038 | End: 2018-08-10
Payer: COMMERCIAL

## 2018-08-10 PROBLEM — I63.239 CAROTID ARTERY STENOSIS WITH CEREBRAL INFARCTION (HCC): Status: ACTIVE | Noted: 2018-08-10

## 2018-08-10 LAB
GLUCOSE BLD STRIP.AUTO-MCNC: 107 MG/DL (ref 65–100)
GLUCOSE BLD STRIP.AUTO-MCNC: 113 MG/DL (ref 65–100)
SERVICE CMNT-IMP: ABNORMAL

## 2018-08-10 PROCEDURE — 74011000250 HC RX REV CODE- 250: Performed by: SURGERY

## 2018-08-10 PROCEDURE — 77030008684 HC TU ET CUF COVD -B: Performed by: ANESTHESIOLOGY

## 2018-08-10 PROCEDURE — 77030012406 HC DRN WND PENRS BARD -A: Performed by: SURGERY

## 2018-08-10 PROCEDURE — 74011250637 HC RX REV CODE- 250/637: Performed by: HOSPITALIST

## 2018-08-10 PROCEDURE — 74011250636 HC RX REV CODE- 250/636: Performed by: ANESTHESIOLOGY

## 2018-08-10 PROCEDURE — 74011250636 HC RX REV CODE- 250/636

## 2018-08-10 PROCEDURE — 77030013965 HC SHNT CAR JAV BARD -B: Performed by: SURGERY

## 2018-08-10 PROCEDURE — 76060000035 HC ANESTHESIA 2 TO 2.5 HR: Performed by: SURGERY

## 2018-08-10 PROCEDURE — 36415 COLL VENOUS BLD VENIPUNCTURE: CPT | Performed by: ANESTHESIOLOGY

## 2018-08-10 PROCEDURE — 77030020153 HC PRB DOPLR DISP MIZU -C: Performed by: SURGERY

## 2018-08-10 PROCEDURE — 77030014008 HC SPNG HEMSTAT J&J -C: Performed by: SURGERY

## 2018-08-10 PROCEDURE — 74011000258 HC RX REV CODE- 258: Performed by: SURGERY

## 2018-08-10 PROCEDURE — 88304 TISSUE EXAM BY PATHOLOGIST: CPT | Performed by: SURGERY

## 2018-08-10 PROCEDURE — 74011000272 HC RX REV CODE- 272: Performed by: SURGERY

## 2018-08-10 PROCEDURE — 74011000250 HC RX REV CODE- 250

## 2018-08-10 PROCEDURE — 03UK0JZ SUPPLEMENT RIGHT INTERNAL CAROTID ARTERY WITH SYNTHETIC SUBSTITUTE, OPEN APPROACH: ICD-10-PCS | Performed by: SURGERY

## 2018-08-10 PROCEDURE — 65610000006 HC RM INTENSIVE CARE

## 2018-08-10 PROCEDURE — 77010033678 HC OXYGEN DAILY

## 2018-08-10 PROCEDURE — 77030011640 HC PAD GRND REM COVD -A: Performed by: SURGERY

## 2018-08-10 PROCEDURE — 74011000258 HC RX REV CODE- 258

## 2018-08-10 PROCEDURE — 94760 N-INVAS EAR/PLS OXIMETRY 1: CPT

## 2018-08-10 PROCEDURE — 77030013567 HC DRN WND RESERV BARD -A: Performed by: SURGERY

## 2018-08-10 PROCEDURE — 77030013079 HC BLNKT BAIR HGGR 3M -A: Performed by: ANESTHESIOLOGY

## 2018-08-10 PROCEDURE — 77030026438 HC STYL ET INTUB CARD -A: Performed by: ANESTHESIOLOGY

## 2018-08-10 PROCEDURE — 03CK0ZZ EXTIRPATION OF MATTER FROM RIGHT INTERNAL CAROTID ARTERY, OPEN APPROACH: ICD-10-PCS | Performed by: SURGERY

## 2018-08-10 PROCEDURE — 77030019908 HC STETH ESOPH SIMS -A: Performed by: ANESTHESIOLOGY

## 2018-08-10 PROCEDURE — 74011250636 HC RX REV CODE- 250/636: Performed by: SURGERY

## 2018-08-10 PROCEDURE — 76010000108 HC CV SURG 2 TO 2.5 HR: Performed by: SURGERY

## 2018-08-10 PROCEDURE — 77030020256 HC SOL INJ NACL 0.9%  500ML: Performed by: SURGERY

## 2018-08-10 PROCEDURE — 77030008771 HC TU NG SALEM SUMP -A: Performed by: ANESTHESIOLOGY

## 2018-08-10 PROCEDURE — 88311 DECALCIFY TISSUE: CPT | Performed by: SURGERY

## 2018-08-10 PROCEDURE — 77030002986 HC SUT PROL J&J -A: Performed by: SURGERY

## 2018-08-10 PROCEDURE — 77030002916 HC SUT ETHLN J&J -A: Performed by: SURGERY

## 2018-08-10 PROCEDURE — 82962 GLUCOSE BLOOD TEST: CPT

## 2018-08-10 PROCEDURE — C1768 GRAFT, VASCULAR: HCPCS | Performed by: SURGERY

## 2018-08-10 PROCEDURE — 76210000016 HC OR PH I REC 1 TO 1.5 HR: Performed by: SURGERY

## 2018-08-10 PROCEDURE — 77030002987 HC SUT PROL J&J -B: Performed by: SURGERY

## 2018-08-10 PROCEDURE — 77030002933 HC SUT MCRYL J&J -A: Performed by: SURGERY

## 2018-08-10 PROCEDURE — 74011250637 HC RX REV CODE- 250/637: Performed by: SURGERY

## 2018-08-10 PROCEDURE — 77030031139 HC SUT VCRL2 J&J -A: Performed by: SURGERY

## 2018-08-10 DEVICE — THIN WALL CAROTID PATCH GELATIN IMPREGNATED THIN WALL KNITTED CAROTID PATCH TAPERED PATCH
Type: IMPLANTABLE DEVICE | Site: CAROTID | Status: FUNCTIONAL
Brand: THINWALL

## 2018-08-10 RX ORDER — NICARDIPINE HYDROCHLORIDE 0.2 MG/ML
INJECTION INTRAVENOUS
Status: DISCONTINUED | OUTPATIENT
Start: 2018-08-10 | End: 2018-08-10 | Stop reason: HOSPADM

## 2018-08-10 RX ORDER — FENTANYL CITRATE 50 UG/ML
50 INJECTION, SOLUTION INTRAMUSCULAR; INTRAVENOUS AS NEEDED
Status: DISCONTINUED | OUTPATIENT
Start: 2018-08-10 | End: 2018-08-10 | Stop reason: HOSPADM

## 2018-08-10 RX ORDER — ROCURONIUM BROMIDE 10 MG/ML
INJECTION, SOLUTION INTRAVENOUS AS NEEDED
Status: DISCONTINUED | OUTPATIENT
Start: 2018-08-10 | End: 2018-08-10 | Stop reason: HOSPADM

## 2018-08-10 RX ORDER — MIDAZOLAM HYDROCHLORIDE 1 MG/ML
INJECTION, SOLUTION INTRAMUSCULAR; INTRAVENOUS AS NEEDED
Status: DISCONTINUED | OUTPATIENT
Start: 2018-08-10 | End: 2018-08-10 | Stop reason: HOSPADM

## 2018-08-10 RX ORDER — LABETALOL HYDROCHLORIDE 5 MG/ML
INJECTION, SOLUTION INTRAVENOUS AS NEEDED
Status: DISCONTINUED | OUTPATIENT
Start: 2018-08-10 | End: 2018-08-10 | Stop reason: HOSPADM

## 2018-08-10 RX ORDER — MORPHINE SULFATE 10 MG/ML
2 INJECTION, SOLUTION INTRAMUSCULAR; INTRAVENOUS
Status: DISCONTINUED | OUTPATIENT
Start: 2018-08-10 | End: 2018-08-10 | Stop reason: HOSPADM

## 2018-08-10 RX ORDER — EPHEDRINE SULFATE 50 MG/ML
INJECTION, SOLUTION INTRAVENOUS AS NEEDED
Status: DISCONTINUED | OUTPATIENT
Start: 2018-08-10 | End: 2018-08-10 | Stop reason: HOSPADM

## 2018-08-10 RX ORDER — LIDOCAINE HYDROCHLORIDE 10 MG/ML
0.1 INJECTION, SOLUTION EPIDURAL; INFILTRATION; INTRACAUDAL; PERINEURAL AS NEEDED
Status: DISCONTINUED | OUTPATIENT
Start: 2018-08-10 | End: 2018-08-10 | Stop reason: HOSPADM

## 2018-08-10 RX ORDER — ATORVASTATIN CALCIUM 40 MG/1
40 TABLET, FILM COATED ORAL
Status: DISCONTINUED | OUTPATIENT
Start: 2018-08-10 | End: 2018-08-15 | Stop reason: HOSPADM

## 2018-08-10 RX ORDER — SODIUM CHLORIDE 0.9 % (FLUSH) 0.9 %
5-10 SYRINGE (ML) INJECTION AS NEEDED
Status: DISCONTINUED | OUTPATIENT
Start: 2018-08-10 | End: 2018-08-10 | Stop reason: HOSPADM

## 2018-08-10 RX ORDER — GUAIFENESIN 100 MG/5ML
81 LIQUID (ML) ORAL DAILY
Status: DISCONTINUED | OUTPATIENT
Start: 2018-08-11 | End: 2018-08-10 | Stop reason: SDUPTHER

## 2018-08-10 RX ORDER — FENTANYL CITRATE 50 UG/ML
25 INJECTION, SOLUTION INTRAMUSCULAR; INTRAVENOUS
Status: DISCONTINUED | OUTPATIENT
Start: 2018-08-10 | End: 2018-08-10 | Stop reason: HOSPADM

## 2018-08-10 RX ORDER — MIDAZOLAM HYDROCHLORIDE 1 MG/ML
1 INJECTION, SOLUTION INTRAMUSCULAR; INTRAVENOUS AS NEEDED
Status: DISCONTINUED | OUTPATIENT
Start: 2018-08-10 | End: 2018-08-10 | Stop reason: HOSPADM

## 2018-08-10 RX ORDER — ACETAMINOPHEN 325 MG/1
650 TABLET ORAL
Status: DISCONTINUED | OUTPATIENT
Start: 2018-08-10 | End: 2018-08-15 | Stop reason: HOSPADM

## 2018-08-10 RX ORDER — HYDROMORPHONE HYDROCHLORIDE 1 MG/ML
INJECTION, SOLUTION INTRAMUSCULAR; INTRAVENOUS; SUBCUTANEOUS
Status: DISCONTINUED
Start: 2018-08-10 | End: 2018-08-10

## 2018-08-10 RX ORDER — OXYCODONE AND ACETAMINOPHEN 5; 325 MG/1; MG/1
1 TABLET ORAL AS NEEDED
Status: DISCONTINUED | OUTPATIENT
Start: 2018-08-10 | End: 2018-08-10 | Stop reason: HOSPADM

## 2018-08-10 RX ORDER — ESMOLOL HYDROCHLORIDE 10 MG/ML
INJECTION INTRAVENOUS AS NEEDED
Status: DISCONTINUED | OUTPATIENT
Start: 2018-08-10 | End: 2018-08-10 | Stop reason: HOSPADM

## 2018-08-10 RX ORDER — CHOLESTYRAMINE 4 G/4.8G
4 POWDER, FOR SUSPENSION ORAL
Status: DISCONTINUED | OUTPATIENT
Start: 2018-08-10 | End: 2018-08-15 | Stop reason: HOSPADM

## 2018-08-10 RX ORDER — PROPOFOL 10 MG/ML
INJECTION, EMULSION INTRAVENOUS AS NEEDED
Status: DISCONTINUED | OUTPATIENT
Start: 2018-08-10 | End: 2018-08-10 | Stop reason: HOSPADM

## 2018-08-10 RX ORDER — SUCCINYLCHOLINE CHLORIDE 20 MG/ML
INJECTION INTRAMUSCULAR; INTRAVENOUS AS NEEDED
Status: DISCONTINUED | OUTPATIENT
Start: 2018-08-10 | End: 2018-08-10 | Stop reason: HOSPADM

## 2018-08-10 RX ORDER — SODIUM CHLORIDE 0.9 % (FLUSH) 0.9 %
5-10 SYRINGE (ML) INJECTION EVERY 8 HOURS
Status: DISCONTINUED | OUTPATIENT
Start: 2018-08-10 | End: 2018-08-10 | Stop reason: HOSPADM

## 2018-08-10 RX ORDER — MUPIROCIN 20 MG/G
OINTMENT TOPICAL 2 TIMES DAILY
Status: DISCONTINUED | OUTPATIENT
Start: 2018-08-11 | End: 2018-08-15 | Stop reason: HOSPADM

## 2018-08-10 RX ORDER — ONDANSETRON 2 MG/ML
INJECTION INTRAMUSCULAR; INTRAVENOUS AS NEEDED
Status: DISCONTINUED | OUTPATIENT
Start: 2018-08-10 | End: 2018-08-10 | Stop reason: HOSPADM

## 2018-08-10 RX ORDER — MIDAZOLAM HYDROCHLORIDE 1 MG/ML
0.5 INJECTION, SOLUTION INTRAMUSCULAR; INTRAVENOUS
Status: DISCONTINUED | OUTPATIENT
Start: 2018-08-10 | End: 2018-08-10 | Stop reason: HOSPADM

## 2018-08-10 RX ORDER — PHENYLEPHRINE HCL IN 0.9% NACL 0.4MG/10ML
SYRINGE (ML) INTRAVENOUS AS NEEDED
Status: DISCONTINUED | OUTPATIENT
Start: 2018-08-10 | End: 2018-08-10 | Stop reason: HOSPADM

## 2018-08-10 RX ORDER — SODIUM CHLORIDE, SODIUM LACTATE, POTASSIUM CHLORIDE, CALCIUM CHLORIDE 600; 310; 30; 20 MG/100ML; MG/100ML; MG/100ML; MG/100ML
75 INJECTION, SOLUTION INTRAVENOUS CONTINUOUS
Status: DISCONTINUED | OUTPATIENT
Start: 2018-08-10 | End: 2018-08-10 | Stop reason: HOSPADM

## 2018-08-10 RX ORDER — NALOXONE HYDROCHLORIDE 0.4 MG/ML
0.4 INJECTION, SOLUTION INTRAMUSCULAR; INTRAVENOUS; SUBCUTANEOUS AS NEEDED
Status: DISCONTINUED | OUTPATIENT
Start: 2018-08-10 | End: 2018-08-15 | Stop reason: HOSPADM

## 2018-08-10 RX ORDER — MORPHINE SULFATE 2 MG/ML
2 INJECTION, SOLUTION INTRAMUSCULAR; INTRAVENOUS
Status: DISCONTINUED | OUTPATIENT
Start: 2018-08-10 | End: 2018-08-15 | Stop reason: HOSPADM

## 2018-08-10 RX ORDER — HYDROMORPHONE HYDROCHLORIDE 1 MG/ML
0.2 INJECTION, SOLUTION INTRAMUSCULAR; INTRAVENOUS; SUBCUTANEOUS
Status: DISCONTINUED | OUTPATIENT
Start: 2018-08-10 | End: 2018-08-10 | Stop reason: HOSPADM

## 2018-08-10 RX ORDER — IBUPROFEN 200 MG
1 TABLET ORAL EVERY 24 HOURS
Status: DISCONTINUED | OUTPATIENT
Start: 2018-08-10 | End: 2018-08-14

## 2018-08-10 RX ORDER — SODIUM CHLORIDE, SODIUM LACTATE, POTASSIUM CHLORIDE, CALCIUM CHLORIDE 600; 310; 30; 20 MG/100ML; MG/100ML; MG/100ML; MG/100ML
INJECTION, SOLUTION INTRAVENOUS
Status: DISCONTINUED | OUTPATIENT
Start: 2018-08-10 | End: 2018-08-10 | Stop reason: HOSPADM

## 2018-08-10 RX ORDER — SODIUM CHLORIDE 9 MG/ML
50 INJECTION, SOLUTION INTRAVENOUS CONTINUOUS
Status: DISCONTINUED | OUTPATIENT
Start: 2018-08-10 | End: 2018-08-10

## 2018-08-10 RX ORDER — SODIUM CHLORIDE 0.9 % (FLUSH) 0.9 %
5-10 SYRINGE (ML) INJECTION EVERY 8 HOURS
Status: DISCONTINUED | OUTPATIENT
Start: 2018-08-10 | End: 2018-08-15 | Stop reason: HOSPADM

## 2018-08-10 RX ORDER — FENTANYL CITRATE 50 UG/ML
INJECTION, SOLUTION INTRAMUSCULAR; INTRAVENOUS AS NEEDED
Status: DISCONTINUED | OUTPATIENT
Start: 2018-08-10 | End: 2018-08-10 | Stop reason: HOSPADM

## 2018-08-10 RX ORDER — METOPROLOL TARTRATE 25 MG/1
12.5 TABLET, FILM COATED ORAL 2 TIMES DAILY
Status: DISCONTINUED | OUTPATIENT
Start: 2018-08-10 | End: 2018-08-13

## 2018-08-10 RX ORDER — SODIUM CHLORIDE 0.9 % (FLUSH) 0.9 %
5-10 SYRINGE (ML) INJECTION AS NEEDED
Status: DISCONTINUED | OUTPATIENT
Start: 2018-08-10 | End: 2018-08-14 | Stop reason: SDUPTHER

## 2018-08-10 RX ORDER — ONDANSETRON 2 MG/ML
4 INJECTION INTRAMUSCULAR; INTRAVENOUS AS NEEDED
Status: DISCONTINUED | OUTPATIENT
Start: 2018-08-10 | End: 2018-08-10 | Stop reason: HOSPADM

## 2018-08-10 RX ORDER — ONDANSETRON 2 MG/ML
4 INJECTION INTRAMUSCULAR; INTRAVENOUS
Status: DISCONTINUED | OUTPATIENT
Start: 2018-08-10 | End: 2018-08-14 | Stop reason: SDUPTHER

## 2018-08-10 RX ORDER — LISINOPRIL 5 MG/1
5 TABLET ORAL DAILY
Status: DISCONTINUED | OUTPATIENT
Start: 2018-08-11 | End: 2018-08-13

## 2018-08-10 RX ORDER — DIPHENHYDRAMINE HYDROCHLORIDE 50 MG/ML
12.5 INJECTION, SOLUTION INTRAMUSCULAR; INTRAVENOUS AS NEEDED
Status: DISCONTINUED | OUTPATIENT
Start: 2018-08-10 | End: 2018-08-10 | Stop reason: HOSPADM

## 2018-08-10 RX ORDER — SODIUM CHLORIDE 9 MG/ML
50 INJECTION, SOLUTION INTRAVENOUS CONTINUOUS
Status: DISCONTINUED | OUTPATIENT
Start: 2018-08-10 | End: 2018-08-10 | Stop reason: HOSPADM

## 2018-08-10 RX ORDER — SODIUM CHLORIDE 9 MG/ML
50 INJECTION, SOLUTION INTRAVENOUS CONTINUOUS
Status: DISCONTINUED | OUTPATIENT
Start: 2018-08-10 | End: 2018-08-11

## 2018-08-10 RX ORDER — SODIUM CHLORIDE, SODIUM LACTATE, POTASSIUM CHLORIDE, CALCIUM CHLORIDE 600; 310; 30; 20 MG/100ML; MG/100ML; MG/100ML; MG/100ML
75 INJECTION, SOLUTION INTRAVENOUS CONTINUOUS
Status: DISCONTINUED | OUTPATIENT
Start: 2018-08-10 | End: 2018-08-10

## 2018-08-10 RX ORDER — LIDOCAINE HYDROCHLORIDE 20 MG/ML
INJECTION, SOLUTION EPIDURAL; INFILTRATION; INTRACAUDAL; PERINEURAL AS NEEDED
Status: DISCONTINUED | OUTPATIENT
Start: 2018-08-10 | End: 2018-08-10 | Stop reason: HOSPADM

## 2018-08-10 RX ORDER — GUAIFENESIN 100 MG/5ML
81 LIQUID (ML) ORAL DAILY
Status: DISCONTINUED | OUTPATIENT
Start: 2018-08-11 | End: 2018-08-14

## 2018-08-10 RX ORDER — HYDROMORPHONE HYDROCHLORIDE 2 MG/ML
INJECTION, SOLUTION INTRAMUSCULAR; INTRAVENOUS; SUBCUTANEOUS AS NEEDED
Status: DISCONTINUED | OUTPATIENT
Start: 2018-08-10 | End: 2018-08-10 | Stop reason: HOSPADM

## 2018-08-10 RX ADMIN — PROPOFOL 30 MG: 10 INJECTION, EMULSION INTRAVENOUS at 14:54

## 2018-08-10 RX ADMIN — Medication 10 ML: at 04:29

## 2018-08-10 RX ADMIN — Medication 80 MCG: at 13:58

## 2018-08-10 RX ADMIN — PROPOFOL 30 MG: 10 INJECTION, EMULSION INTRAVENOUS at 14:48

## 2018-08-10 RX ADMIN — CHOLESTYRAMINE 4 G: 4 POWDER, FOR SUSPENSION ORAL at 17:33

## 2018-08-10 RX ADMIN — Medication 10 ML: at 23:08

## 2018-08-10 RX ADMIN — METOPROLOL TARTRATE 12.5 MG: 25 TABLET ORAL at 08:26

## 2018-08-10 RX ADMIN — MORPHINE SULFATE 2 MG: 2 INJECTION, SOLUTION INTRAMUSCULAR; INTRAVENOUS at 22:53

## 2018-08-10 RX ADMIN — EPHEDRINE SULFATE 5 MG: 50 INJECTION, SOLUTION INTRAVENOUS at 13:53

## 2018-08-10 RX ADMIN — MIDAZOLAM HYDROCHLORIDE 1 MG: 1 INJECTION, SOLUTION INTRAMUSCULAR; INTRAVENOUS at 12:20

## 2018-08-10 RX ADMIN — METOPROLOL TARTRATE 12.5 MG: 25 TABLET ORAL at 17:54

## 2018-08-10 RX ADMIN — SODIUM CHLORIDE, SODIUM LACTATE, POTASSIUM CHLORIDE, CALCIUM CHLORIDE: 600; 310; 30; 20 INJECTION, SOLUTION INTRAVENOUS at 12:56

## 2018-08-10 RX ADMIN — EPHEDRINE SULFATE 5 MG: 50 INJECTION, SOLUTION INTRAVENOUS at 13:58

## 2018-08-10 RX ADMIN — PROPOFOL 20 MG: 10 INJECTION, EMULSION INTRAVENOUS at 14:02

## 2018-08-10 RX ADMIN — SODIUM CHLORIDE 4 MG/HR: 900 INJECTION, SOLUTION INTRAVENOUS at 23:11

## 2018-08-10 RX ADMIN — PROPOFOL 120 MG: 10 INJECTION, EMULSION INTRAVENOUS at 13:07

## 2018-08-10 RX ADMIN — Medication 10 ML: at 17:27

## 2018-08-10 RX ADMIN — SODIUM CHLORIDE 2.5 MG/HR: 900 INJECTION, SOLUTION INTRAVENOUS at 19:02

## 2018-08-10 RX ADMIN — Medication 10 ML: at 17:15

## 2018-08-10 RX ADMIN — SODIUM CHLORIDE 5 MG/HR: 900 INJECTION, SOLUTION INTRAVENOUS at 16:00

## 2018-08-10 RX ADMIN — ESMOLOL HYDROCHLORIDE 20 MG: 10 INJECTION INTRAVENOUS at 14:02

## 2018-08-10 RX ADMIN — PROPOFOL 20 MG: 10 INJECTION, EMULSION INTRAVENOUS at 14:45

## 2018-08-10 RX ADMIN — PROPOFOL 30 MG: 10 INJECTION, EMULSION INTRAVENOUS at 14:00

## 2018-08-10 RX ADMIN — EPHEDRINE SULFATE 5 MG: 50 INJECTION, SOLUTION INTRAVENOUS at 13:57

## 2018-08-10 RX ADMIN — ATORVASTATIN CALCIUM 40 MG: 40 TABLET, FILM COATED ORAL at 22:40

## 2018-08-10 RX ADMIN — NICARDIPINE HYDROCHLORIDE 5 MG/HR: 0.2 INJECTION INTRAVENOUS at 15:00

## 2018-08-10 RX ADMIN — FENTANYL CITRATE 50 MCG: 50 INJECTION, SOLUTION INTRAMUSCULAR; INTRAVENOUS at 12:20

## 2018-08-10 RX ADMIN — FENTANYL CITRATE 50 MCG: 50 INJECTION, SOLUTION INTRAMUSCULAR; INTRAVENOUS at 14:00

## 2018-08-10 RX ADMIN — PROPOFOL 20 MG: 10 INJECTION, EMULSION INTRAVENOUS at 13:24

## 2018-08-10 RX ADMIN — FENTANYL CITRATE 50 MCG: 50 INJECTION, SOLUTION INTRAMUSCULAR; INTRAVENOUS at 14:04

## 2018-08-10 RX ADMIN — PROPOFOL 40 MG: 10 INJECTION, EMULSION INTRAVENOUS at 13:10

## 2018-08-10 RX ADMIN — LIDOCAINE HYDROCHLORIDE 60 MG: 20 INJECTION, SOLUTION EPIDURAL; INFILTRATION; INTRACAUDAL; PERINEURAL at 13:07

## 2018-08-10 RX ADMIN — SUCCINYLCHOLINE CHLORIDE 160 MG: 20 INJECTION INTRAMUSCULAR; INTRAVENOUS at 13:07

## 2018-08-10 RX ADMIN — PROPOFOL 50 MG: 10 INJECTION, EMULSION INTRAVENOUS at 14:03

## 2018-08-10 RX ADMIN — PROPOFOL 20 MG: 10 INJECTION, EMULSION INTRAVENOUS at 13:32

## 2018-08-10 RX ADMIN — SODIUM CHLORIDE 50 ML/HR: 900 INJECTION, SOLUTION INTRAVENOUS at 16:02

## 2018-08-10 RX ADMIN — SODIUM CHLORIDE, POTASSIUM CHLORIDE, SODIUM LACTATE AND CALCIUM CHLORIDE 75 ML/HR: 600; 310; 30; 20 INJECTION, SOLUTION INTRAVENOUS at 12:00

## 2018-08-10 RX ADMIN — ROCURONIUM BROMIDE 5 MG: 10 INJECTION, SOLUTION INTRAVENOUS at 13:07

## 2018-08-10 RX ADMIN — HYDROMORPHONE HYDROCHLORIDE 0.5 MG: 2 INJECTION, SOLUTION INTRAMUSCULAR; INTRAVENOUS; SUBCUTANEOUS at 15:24

## 2018-08-10 RX ADMIN — FENTANYL CITRATE 50 MCG: 50 INJECTION, SOLUTION INTRAMUSCULAR; INTRAVENOUS at 13:03

## 2018-08-10 RX ADMIN — ROCURONIUM BROMIDE 20 MG: 10 INJECTION, SOLUTION INTRAVENOUS at 13:55

## 2018-08-10 RX ADMIN — MIDAZOLAM HYDROCHLORIDE 1 MG: 1 INJECTION, SOLUTION INTRAMUSCULAR; INTRAVENOUS at 12:58

## 2018-08-10 RX ADMIN — LABETALOL HYDROCHLORIDE 5 MG: 5 INJECTION, SOLUTION INTRAVENOUS at 14:52

## 2018-08-10 RX ADMIN — PROPOFOL 50 MG: 10 INJECTION, EMULSION INTRAVENOUS at 14:51

## 2018-08-10 RX ADMIN — ROCURONIUM BROMIDE 45 MG: 10 INJECTION, SOLUTION INTRAVENOUS at 13:20

## 2018-08-10 RX ADMIN — FENTANYL CITRATE 25 MCG: 50 INJECTION, SOLUTION INTRAMUSCULAR; INTRAVENOUS at 16:04

## 2018-08-10 RX ADMIN — ACETAMINOPHEN 650 MG: 325 TABLET ORAL at 21:16

## 2018-08-10 RX ADMIN — ONDANSETRON 4 MG: 2 INJECTION INTRAMUSCULAR; INTRAVENOUS at 14:35

## 2018-08-10 RX ADMIN — Medication 80 MCG: at 13:57

## 2018-08-10 RX ADMIN — LABETALOL HYDROCHLORIDE 5 MG: 5 INJECTION, SOLUTION INTRAVENOUS at 14:56

## 2018-08-10 RX ADMIN — PROPOFOL 20 MG: 10 INJECTION, EMULSION INTRAVENOUS at 14:11

## 2018-08-10 RX ADMIN — MORPHINE SULFATE 1 MG: 2 INJECTION, SOLUTION INTRAMUSCULAR; INTRAVENOUS at 17:54

## 2018-08-10 RX ADMIN — FENTANYL CITRATE 25 MCG: 50 INJECTION, SOLUTION INTRAMUSCULAR; INTRAVENOUS at 15:52

## 2018-08-10 RX ADMIN — Medication 2 G: at 13:20

## 2018-08-10 RX ADMIN — SODIUM CHLORIDE 4 MG/HR: 900 INJECTION, SOLUTION INTRAVENOUS at 23:10

## 2018-08-10 NOTE — PERIOP NOTES
TRANSFER - OUT REPORT:    Verbal report given to Silvino Luna RN on Deitra Or  being transferred to [address] [place] [address] for routine post - op       Report consisted of patients Situation, Background, Assessment and   Recommendations(SBAR). Information from the following report(s) OR Summary, Procedure Summary, Intake/Output and MAR was reviewed with the receiving nurse. Opportunity for questions and clarification was provided.       Patient transported with:   Monitor  O2 @ 2 liters  Registered Nurse  [facility]

## 2018-08-10 NOTE — PROGRESS NOTES
Hospitalist Progress Note    NAME: Justin Collins   :  1955   MRN:  [id number]     This patient is at above high risk of deterioration based on documented presenting clinical data, comorbid conditions, high risk of adverse events and current acute care course. Assessment / Plan:  TIA due to recurrent stroke due to Severe bilateral carotid stenoses, POA  Recent embolic stroke right corona radiata and right parietal lobe 8/3/18  -symptoms resolved, MRI reveals near resolution of all prior damage but one small location  -for surgery with Vascular surgery today for right CEA, if all goes well, for L CEA Monday  -continue with pt/ot  -lipitor continues once tolerating PO again  -bp control with lopressor. bp stable, resume with PO intake     HTN  -lopressor only for now - consider restarting acei prior to dc or sooner if renal function remains intact     Hyperlipidemia  --continue statin      Tobacco abuse  --chews snuff   -discussed with patient in room and again discussed importance of cessation. Patient is interested     Obesity  Body mass index is 34.62 kg/(m^2). Discussed weight loss post surgery and he is very interested. Will continue to encourage. Borderline diabetes  -bs 107-163. Follow. No role for now for medications - consideration for metformin should sugars trend up  -noted is a1c 6.3  -suspect diet and exercise should be considered first. DTC to see for OP education     Code:  full  DVT prophylaxis: SCD  Surrogate decision maker:  Has daughter Norberto Yuen in [address] [place] [address] 153.   Emergency contact friend Beata Smith [phone]    Code status: Full  Prophylaxis: SCD's  Recommended Disposition: Home w/Family, SNF/LTC and  PT, OT, RN     Medical Decision Making Today  · Acute or chronic illness that poses a threat to life or bodily function  · I have reviewed the flowsheet and previous days notes  · One or more chronic illnesses with severe exacerbation, progression or side effects of treatment  · Review and order of Clinical lab tests  · Discuss case with Specialist MD    Subjective:     Chief Complaint / Reason for Physician Visit  \"i feel fine, ready to do this\" Discussed with RN events overnight. No symptoms today. No ha/changed vision. No numbness    Review of Systems:  Symptom Y/N Comments  Symptom Y/N Comments   Fever/Chills n   Chest Pain n    Poor Appetite n   Edema n    Cough    Abdominal Pain n    Sputum n   Joint Pain     SOB/COATS n   Pruritis/Rash     Nausea/vomit n   Tolerating PT/OT     Diarrhea    Tolerating Diet y    Constipation n   Other       Could NOT obtain due to:      Objective:     VITALS:   Last 24hrs VS reviewed since prior progress note. Most recent are:  Patient Vitals for the past 24 hrs:   Temp Pulse Resp BP SpO2   08/10/18 0427 97.4 °F (36.3 °C) (!) 59 18 134/63 97 %   08/09/18 2344 97.9 °F (36.6 °C) 66 18 123/46 96 %   08/09/18 1908 97.9 °F (36.6 °C) 64 18 119/51 97 %   08/09/18 1516 97.8 °F (36.6 °C) 66 18 130/49 99 %   08/09/18 1100 97.7 °F (36.5 °C) 62 18 120/44 98 %   08/09/18 0748 97.7 °F (36.5 °C) 66 18 130/59 98 %     No intake or output data in the 24 hours ending 08/10/18 0734     PHYSICAL EXAM:  General: WD, obese m sitting up in chair, interactive, Alert, cooperative, no acute distress    EENT:  EOMI. Anicteric sclerae. MM dry  Resp:  CTA bilaterally, no wheezing or rales. No accessory muscle use  CV:  2/6 thierry. Regular  rhythm,  No edema  GI:  Soft, Non distended, Non tender.  +Bowel sounds  Neurologic:  Alert and oriented X 3, normal speech  Psych:   Good insight. Not anxious nor agitated  Skin:  no rashes or ulcers.   No jaundice    Reviewed most current lab test results and cultures  YES  Reviewed most current radiology test results   YES  Review and summation of old records today    NO  Reviewed patient's current orders and MAR    YES  PMH/SH reviewed - no change compared to H&P  ________________________________________________________________________  Care Plan discussed with:    Comments   Patient x Discussed with patient in room. For surgery today. Questions answered. 7   Family      RN x    Care Manager     Consultant: stalin Hayward 5                     Multidiciplinary team rounds were held today with , nursing, pharmacist and clinical coordinator. Patient's plan of care was discussed; medications were reviewed and discharge planning was addressed. 5   ________________________________________________________________________  Total NON critical care TIME:  15   Minutes    Total CRITICAL CARE TIME Spent:   Minutes non procedure based. I have provided critical care time. During this entire length of time I was immediately available to the patient. The reason for providing this level of medical care was due to a critical illness that impaired one or more vital organ systems, such that there was a high probability of imminent or life threatening deterioration in the patient's condition. This care involved high complexity decision making which includes reviewing the patient's past medical records, current laboratory results, and actual Xray films in order to assess, support vital system function, and to treat this degree of vital organ system failure, and to prevent further life threatening deterioration of the patients condition. Comments   >50% of visit spent in counseling and coordination of care x See above   ________________________________________________________________________  Procedures: see electronic medical records for all procedures/Xrays and details which were not copied into this note but were reviewed prior to creation of Plan.       LABS:  Recent Labs      08/07/18   1120   WBC  9.8   HGB  14.6   HCT  42.8   PLT  286     Recent Labs      08/07/18   1120   NA  139   K  4.3   CL  103   CO2  28   BUN  14   CREA  0.99   GLU  88   CA  8.7   MG  2.1 Recent Labs      08/07/18   1120   SGOT  16   ALT  33   AP  86   TBILI  1.0   TP  8.0   ALB  3.8   GLOB  4.2*     Recent Labs      08/07/18   1156  08/07/18   1120   INR  1.0  1.0   PTP   --   10.0      No results for input(s): FE, TIBC, PSAT, FERR in the last 72 hours. No results found for: FOL, RBCF   No results for input(s): PH, PCO2, PO2 in the last 72 hours. No results for input(s): PHI, PO2I, PCO2I in the last 72 hours. Recent Labs      08/07/18   1120   CPK  60   CKNDX  Cannot be calculated   TROIQ  <0.05     Lab Results   Component Value Date/Time    Cholesterol, total 173 08/03/2018 05:40 AM    HDL Cholesterol 38 08/03/2018 05:40 AM    LDL, calculated 107.6 (H) 08/03/2018 05:40 AM    Triglyceride 137 08/03/2018 05:40 AM    CHOL/HDL Ratio 4.6 08/03/2018 05:40 AM     Lab Results   Component Value Date/Time    Glucose (POC) 107 (H) 08/10/2018 06:35 AM    Glucose (POC) 112 (H) 08/09/2018 09:12 PM    Glucose (POC) 113 (H) 08/09/2018 04:23 PM    Glucose (POC) 130 (H) 08/09/2018 11:05 AM    Glucose (POC) 112 (H) 08/09/2018 06:35 AM     Lab Results   Component Value Date/Time    Color YELLOW/STRAW 08/07/2018 11:20 AM    Appearance CLEAR 08/07/2018 11:20 AM    Specific gravity 1.016 08/07/2018 11:20 AM    pH (UA) 7.0 08/07/2018 11:20 AM    Protein NEGATIVE  08/07/2018 11:20 AM    Glucose NEGATIVE  08/07/2018 11:20 AM    Ketone NEGATIVE  08/07/2018 11:20 AM    Bilirubin NEGATIVE  08/07/2018 11:20 AM    Urobilinogen 1.0 08/07/2018 11:20 AM    Nitrites NEGATIVE  08/07/2018 11:20 AM    Leukocyte Esterase NEGATIVE  08/07/2018 11:20 AM       RADIOGRAPHIC STUDIES:  CXR Results  (Last 48 hours)    None          CT Results  (Last 48 hours)               08/08/18 0815  CTA HEAD NECK W CONT Final result    Impression:  IMPRESSION:         Moderate to severe hemodynamically significant stenoses in the proximal internal   carotid arteries on the right and on the left.  Greater than 80% stenosis present   on the right and on the left with soft mural plaque at the origin of the   internal carotid arteries. There is moderate atherosclerotic plaque of the   common carotid vessels as well. .       These findings were related to Dr. Eveline Welch and Dr. Hunter Ferguson at approximately 11:30   AM on 8/8/2018               Narrative:  EXAM:  CTA HEAD NECK W CONT           HISTORY: Multiple CVAs on MRI 8/3/2018   INDICATION:   multiple strokes on mri 8/3, severe carotid stenosis on US       COMPARISON:  MRA 3/20/2018, CTA 2/20/2018. CONTRAST:  100 mL of Isovue-370. TECHNIQUE:  Unenhanced  images were obtained to localize the volume for   acquisition. Multislice helical axial CT angiography was performed from the   aortic arch to the top of the head during uneventful rapid bolus intravenous   contrast administration. Coronal and sagittal reformations and 3D post   processing was performed. CT dose reduction was achieved through use of a   standardized protocol tailored for this examination and automatic exposure   control for dose modulation. FINDINGS:       CTA NECK   There is no large pulmonary mass or nodule. 3 vessel aortic arch. Vertebral   artery origins within normal limits. Right vertebral artery slightly larger than   left vertebral artery. . There is extensive atherosclerotic change extending into   the proximal internal carotid arteries on the right and on the left. [place] % of right carotid artery stenosis: 80%   % of left carotid artery stenosis: 80-90%   There is soft mural plaque in both the right and left internal carotid arteries   at their origins. There is atherosclerotic plaque in the common carotid vessels   distally as well. NASCET method was utilized for calculating stenosis. Right vertebral artery slightly larger than left vertebral artery. .  The   cervical soft tissues are unremarkable. Multilevel severe foraminal stenoses   largely related to significant facet hypertrophy. .       CTA HEAD   Mild stenosis in the distal left vertebral artery. A 2 segments are patent. A1   segments are patent. M1 segments are patent and demonstrate symmetric   arborization. Cavernous and petrous ICAs are patent. The left A2 segment is   somewhat diminutive in size. . The basilar artery and its branches are normal. A   2 segments are within normal limits. There are A1 segments bilaterally. . There   is no flow-limiting intracranial stenosis. Dural venous sinuses are patent. .   There are no sizable posterior communicating arteries. Echo Results  (Last 48 hours)    None          VENOUS DOPPLER results  (Last 48 hours)    None          CULTURES:    No results found for: SDES No results found for: CULT       Signed: Nicholas Castillo MD    This note will not be viewable in [address]

## 2018-08-10 NOTE — PERIOP NOTES
Spoke  With nurse  estee adame. She reportsher pt is npo exceptformeds. vss  Consent on chart. Medical diagnosis reviewed and kardex reviewed. Nurse Darrion Tucker informed that pt will be picked up for surgern shorty or team will call when we are heading upstairs for pt.

## 2018-08-10 NOTE — ANESTHESIA POSTPROCEDURE EVALUATION
Post-Anesthesia Evaluation and Assessment    Patient: Baldemar No MRN: [id number]  SSN: [id number]    YOB: 1955  Age: 57 y.o. Sex: male       Cardiovascular Function/Vital Signs  Visit Vitals    /49    Pulse 64    Temp 36.6 °C (97.9 °F)    Resp 16    Ht 6' (1.829 m)    Wt 113.4 kg (250 lb)    SpO2 98%    BMI 33.91 kg/m2       Patient is status post general anesthesia for Procedure(s):  RIGHT CAROTID ARTERY ENDARTERECTOMY. Nausea/Vomiting: None    Postoperative hydration reviewed and adequate. Pain:  Pain Scale 1: Numeric (0 - 10) (08/10/18 1552)  Pain Intensity 1: 6 (08/10/18 1552)   Managed    Neurological Status:   Neuro (WDL): Exceptions to WDL (08/10/18 1525)  Neuro  Neurologic State: Drowsy; Eyes open spontaneously (08/10/18 1525)  Orientation Level: Oriented to person;Oriented to place;Oriented to situation (08/10/18 1525)  Cognition: Follows commands (08/10/18 1525)  Speech: Clear (08/10/18 1525)  Assessment L Pupil: Brisk (08/10/18 1109)  Size L Pupil (mm): 1 (08/10/18 1109)  Assessment R Pupil: Brisk (08/10/18 1109)  Size R Pupil (mm): 1 (08/10/18 1109)  LUE Motor Response: Purposeful (08/10/18 1525)  LLE Motor Response: Purposeful (08/10/18 1525)  RUE Motor Response: Purposeful (08/10/18 1525)  RLE Motor Response: Purposeful (08/10/18 1525)   At baseline    Mental Status and Level of Consciousness: Arousable    Pulmonary Status:   O2 Device: Nasal cannula (08/10/18 1525)   Adequate oxygenation and airway patent    Complications related to anesthesia: None    Post-anesthesia assessment completed.  No concerns    Signed By: Teresita Palmer MD     August 10, 2018

## 2018-08-10 NOTE — ANESTHESIA PREPROCEDURE EVALUATION
Anesthetic History   No history of anesthetic complications            Review of Systems / Medical History  Patient summary reviewed, nursing notes reviewed and pertinent labs reviewed    Pulmonary          Undiagnosed apnea and smoker         Neuro/Psych       CVA  TIA and psychiatric history    Comments: Bilateral carpal tunnel syndrome       Transient ischemic attack involving right internal carotid artery      Bilateral carotid artery stenosis    Cardiovascular    Hypertension              Exercise tolerance: >4 METS     GI/Hepatic/Renal  Within defined limits              Endo/Other        Obesity     Other Findings            Physical Exam    Airway  Mallampati: III  TM Distance: > 6 cm  Neck ROM: short neck        Cardiovascular  Regular rate and rhythm,  S1 and S2 normal,  no murmur, click, rub, or gallop  Rhythm: regular  Rate: normal         Dental    Dentition: Caps/crowns     Pulmonary  Breath sounds clear to auscultation               Abdominal  GI exam deferred       Other Findings            Anesthetic Plan    ASA: 3  Anesthesia type: general    Monitoring Plan: Arterial line      Induction: Intravenous  Anesthetic plan and risks discussed with: Patient

## 2018-08-10 NOTE — PERIOP NOTES
Handoff Report from Operating Room to PACU    Report received from Rita Segal RN and Ciro Horvath CRNA regarding Eduardo Almaguer. Surgeon(s):  Preston Warner MD  And Procedure(s) (LRB):  RIGHT CAROTID ARTERY ENDARTERECTOMY (Right)  confirmed   with drains and dressings discussed. Anesthesia type, drugs, patient history, complications, estimated blood loss, vital signs, intake and output, and last pain medication, lines and temperature were reviewed.

## 2018-08-10 NOTE — PROGRESS NOTES
Neurology Progress Note    Patient ID:  Eduardo Almaguer  [id number]  57 y.o.  1955      CHIEF COMPLAINT: Left-sided weakness and numbness both hands    Subjective:      Patient has complaints of left-sided weakness that occurred several days ago, but that has now resolved completely, and he was admitted because of recurring numbness of both hands pretty clearly this looks like carpal tunnel syndrome. Patient was admitted to [facility], and carotid Dopplers that showed 80-90% disease, and he had a clear left hemiparesis associated with a TIA that was symptomatic from the right carotid, and an MRI scan today shows only one small punctate area of ischemia left and the patient clinically is completely back to normal.  His CTA shows 95% stenosis of the right internal carotid artery with a soft thrombus probably there and plaque, and I discussed with Dr. Edgar Sloan, and I agree he needs surgery urgently because he is a high risk for progression of disease with a stroke in major disability and perhaps morbidity if he goes untreated. [place] do not know about his follow-up. Because of his severe stenosis and his young age even on the left side, he probably needs that done after the right side been done. His CTA, his MRI scan and MRA all reviewed personally on the PACS system and I agree with reports as dictated.     Current Facility-Administered Medications   Medication Dose Route Frequency    ceFAZolin (ANCEF) 2 g/20 mL in sterile water IV syringe  2 g IntraVENous ON CALL TO OR    insulin lispro (HUMALOG) injection   SubCUTAneous AC&HS    glucose chewable tablet 16 g  4 Tab Oral PRN    dextrose (D50W) injection syrg 12.5-25 g  12.5-25 g IntraVENous PRN    glucagon (GLUCAGEN) injection 1 mg  1 mg IntraMUSCular PRN    clopidogrel (PLAVIX) tablet 75 mg  75 mg Oral DAILY    enoxaparin (LOVENOX) injection 40 mg  40 mg SubCUTAneous Q24H    sodium chloride (NS) flush 5-10 mL  5-10 mL IntraVENous Q8H    sodium chloride (NS) flush 5-10 mL  5-10 mL IntraVENous PRN    acetaminophen (TYLENOL) tablet 650 mg  650 mg Oral Q4H PRN    Or    acetaminophen (TYLENOL) solution 650 mg  650 mg Per NG tube Q4H PRN    Or    acetaminophen (TYLENOL) suppository 650 mg  650 mg Rectal Q4H PRN    ondansetron (ZOFRAN) injection 4 mg  4 mg IntraVENous Q6H PRN    aspirin delayed-release tablet 81 mg  81 mg Oral DAILY    docusate sodium (COLACE) capsule 100 mg  100 mg Oral BID    LORazepam (ATIVAN) injection 1 mg  1 mg IntraVENous PRN    atorvastatin (LIPITOR) tablet 40 mg  40 mg Oral QHS    colestipol (COLESTID) tablet 2 g  2 g Oral DAILY    metoprolol tartrate (LOPRESSOR) tablet 12.5 mg  12.5 mg Oral BID    hydrALAZINE (APRESOLINE) 20 mg/mL injection 10 mg  10 mg IntraVENous Q2H PRN        Past Medical History:   Diagnosis Date    Bilateral carotid artery stenosis     880-99%    Stroke [facility]cerebrum) ([facility]) 08/02/2018    multiple embolic stroke on right coronal radiata and right parietal       Past Surgical History:   Procedure Laterality Date    HX CHOLECYSTECTOMY         [unfilled]    Social History   Substance Use Topics    Smoking status: Never Smoker    Smokeless tobacco: Current User    Alcohol use Yes       Current Facility-Administered Medications   Medication Dose Route Frequency Provider Last Rate Last Dose    ceFAZolin (ANCEF) 2 g/20 mL in sterile water IV syringe  2 g IntraVENous ON CALL TO OR Marquise Downs MD        insulin lispro (HUMALOG) injection   SubCUTAneous AC&HS Gbarielle Campo MD   Stopped at 08/08/18 2200    glucose chewable tablet 16 g  4 Tab Oral PRN Gabrielle Campo MD        dextrose (D50W) injection syrg 12.5-25 g  12.5-25 g IntraVENous PRN Gabrielle Campo MD        glucagon (GLUCAGEN) injection 1 mg  1 mg IntraMUSCular PRN Gabrielle Campo MD        clopidogrel (PLAVIX) tablet 75 mg  75 mg Oral DAILY Marquise Downs MD   75 mg at 08/09/18 0855    enoxaparin (LOVENOX) injection 40 mg  40 mg SubCUTAneous Q24H Alex Velasquez MD   40 mg at 08/09/18 1505    sodium chloride (NS) flush 5-10 mL  5-10 mL IntraVENous Q8H Cori Laws MD   10 mL at 08/09/18 2135    sodium chloride (NS) flush 5-10 mL  5-10 mL IntraVENous PRN Cori Laws MD   10 mL at 08/09/18 0314    acetaminophen (TYLENOL) tablet 650 mg  650 mg Oral Q4H PRN Cori Laws MD        Or   Solo Cords acetaminophen (TYLENOL) solution 650 mg  650 mg Per NG tube Q4H PRN Cori Laws MD        Or   Solo Cords acetaminophen (TYLENOL) suppository 650 mg  650 mg Rectal Q4H PRN Cori Laws MD        ondansetron [facility]) injection 4 mg  4 mg IntraVENous Q6H PRN Cori Laws MD        aspirin delayed-release tablet 81 mg  81 mg Oral DAILY Cori Laws MD   81 mg at 08/09/18 6849    docusate sodium (COLACE) capsule 100 mg  100 mg Oral BID Cori Laws MD   100 mg at 08/09/18 1719    LORazepam (ATIVAN) injection 1 mg  1 mg IntraVENous PRN Cori Laws MD        atorvastatin (LIPITOR) tablet 40 mg  40 mg Oral QHS Cori Laws MD   40 mg at 08/09/18 2135    colestipol (COLESTID) tablet 2 g  2 g Oral DAILY Cori Laws MD   2 g at 08/09/18 0855    metoprolol tartrate (LOPRESSOR) tablet 12.5 mg  12.5 mg Oral BID Cori Laws MD   12.5 mg at 08/09/18 1720    hydrALAZINE (APRESOLINE) 20 mg/mL injection 10 mg  10 mg IntraVENous Q2H PRN Cori Laws MD           Allergies   Allergen Reactions    Iodine Hives       Review of Systems:    A comprehensive review of systems was negative except for: Neurological: positive for paresthesia and History of TIA and numbness in his hand suggesting carpal tunnel    Objective:      Objective:     Patient Vitals for the past 24 hrs:   BP Temp Pulse Resp SpO2   08/09/18 1908 119/51 97.9 °F (36.6 °C) 64 18 97 %   08/09/18 1516 130/49 97.8 °F (36.6 °C) 66 18 99 %   08/09/18 1100 120/44 97.7 °F (36.5 °C) 62 18 98 %   08/09/18 0748 130/59 97.7 °F (36.5 °C) 66 18 98 %   08/09/18 0315 142/66 97.6 °F (36.4 °C) 62 18 98 %   08/08/18 2347 151/64 97.5 °F (36.4 °C) 65 18 99 %         Lab Review   Recent Results (from the past 24 hour(s))   GLUCOSE, POC    Collection Time: 08/09/18  6:35 AM   Result Value Ref Range    Glucose (POC) 112 (H) 65 - 100 mg/dL    Performed by Yakov Del Real (PCT)    GLUCOSE, POC    Collection Time: 08/09/18 11:05 AM   Result Value Ref Range    Glucose (POC) 130 (H) 65 - 100 mg/dL    Performed by Carlos A Finn (PCT)    GLUCOSE, POC    Collection Time: 08/09/18  4:23 PM   Result Value Ref Range    Glucose (POC) 113 (H) 65 - 100 mg/dL    Performed by Sammy uGadarrama (PCT)    GLUCOSE, POC    Collection Time: 08/09/18  9:12 PM   Result Value Ref Range    Glucose (POC) 112 (H) 65 - 100 mg/dL    Performed by Rosemarie Love (PCT)            Additional comments:I personally viewed and interpreted the patient's CTA, MRI and MRAs all reviewed personally on the PACS system    NEUROLOGICAL EXAM:     Appearance: The patient is well developed, well nourished, provides a coherent history and is in no acute distress. Mental Status: Oriented to time, place and person, and the president, cognitive function is normal and speech is fluent and no aphasia or dysarthria. Mood and affect appropriate. Cranial Nerves:   Intact visual fields. Fundi are benign. DIAN, EOM's full, no nystagmus, no ptosis. Facial sensation is normal. Corneal reflexes are not tested. Facial movement is symmetric. Hearing is normal bilaterally. Palate is midline with normal sternocleidomastoid and trapezius muscles are normal. Tongue is midline. Neck without meningismus or bruits  Temporal arteries are not tender or enlarged   Motor:  5/5 strength in upper and lower proximal and distal muscles. Normal bulk and tone. No fasciculations.    Reflexes:   Deep tendon reflexes 2+/4 and symmetrical.  No babinski or clonus present  He has Tinel's over both median nerves at the wrists   Sensory:   Normal to touch, pinprick and vibration. DSS is intact   Gait:  Normal gait. Tremor:   No tremor noted. Cerebellar:  No cerebellar signs present. Neurovascular:  Normal heart sounds and regular rhythm, peripheral pulses decreased, and no carotid bruits.                    Assessment:        Assessment:       ICD-10-CM ICD-9-CM    1. Bilateral carotid artery stenosis I65.23 433.10      433.30    2. Bilateral carpal tunnel syndrome G56.03 354.0    3. Cerebrovascular accident (CVA), unspecified mechanism ([facility] [address] I63.9 434.91    4. Transient ischemic attack involving right internal carotid artery G45.1 435.8    5. Transient cerebral ischemia, unspecified type G45.9 435.9      Active Problems:    Stroke (cerebrum) ([facility]) (8/7/2018)      Bilateral carpal tunnel syndrome (8/7/2018)      Transient ischemic attack involving right internal carotid artery (8/7/2018)      Bilateral carotid artery stenosis (8/7/2018)        Plan:     Patient has complaints of left-sided weakness that occurred several days ago, but that has now resolved completely, and he was admitted because of recurring numbness of both hands pretty clearly this looks like carpal tunnel syndrome. Patient was admitted to [facility], and carotid Dopplers that showed 80-90% disease, and he had a clear left hemiparesis associated with a TIA that was symptomatic from the right carotid, and an MRI scan today shows only one small punctate area of ischemia left and the patient clinically is completely back to normal.  His CTA shows 95% stenosis of the right internal carotid artery with a soft thrombus probably there and plaque, and I discussed with Dr. Manda Tate, and I agree he needs surgery urgently because he is a high risk for progression of disease with a stroke in major disability and perhaps morbidity if he goes untreated. [place] and we do not know about his follow-up.   Because of his severe stenosis and his young age even on the left side, he probably needs that done after the right side been done. His CTA, his MRI scan and MRA all reviewed personally on the PACS system and I agree with reports as dictated.   Surgery on Friday, patient doing well, continue current therapy      Signed:  Iris Huffman MD  8/9/2018  9 AM    None  None

## 2018-08-10 NOTE — BRIEF OP NOTE
BRIEF OPERATIVE NOTE    Date of Procedure: 8/10/2018   Preoperative Diagnosis: Symptomatic BRENNEN stenosis  Postoperative Diagnosis: same   Procedure(s): R CEA w/dacron patch  Surgeon: Wilmer Winter MD    Anesthesia: General   Estimated Blood Loss: minimal  Specimens: plaque   Findings: high grade lesion with extensive ulceration/loose debris   Complications: none  Implants:   Implant Name Type Inv.  Item Serial No.  Lot No. LRB No. Used Action   GRAFT PTCH TW GEL SEAL [id number] -- Janie Lee   GRAFT PTCH TW GEL SEAL [id number] -- Dionisio Harmon [id number] [facility] CARDIOVASCULAR [id number] Right 1 Implanted

## 2018-08-10 NOTE — ROUTINE PROCESS
sbar in note pt to holding area   Identifies self and procedure for today. Pt has been npo except for meds. Vss. Consent in order. Bruise noted to left flank area. States he banged into door at work. paula - right and left temporal pulse palp. Smile and tongue pretrussion symmetrical.   Right and left radial pulse palp. Brisk cap refill right and left dp and pt palp. Brisk cap refill strong foot bushes bilaterally. Pt alert and oriented x 4   Pt reports stroke history in the past and has only residual of tingling and numbness at times to both hands  Intermittently.

## 2018-08-10 NOTE — ANESTHESIA PROCEDURE NOTES
Arterial Line Placement    Performed by: Carmen Jimenez  Authorized by: Carmen Jimenez     Pre-Procedure  Indications:  Arterial pressure monitoring and blood sampling  Preanesthetic Checklist: patient identified, risks and benefits discussed, anesthesia consent, site marked, patient being monitored, timeout performed and patient being monitored      Procedure:   Prep:  ChloraPrep  Seldinger Technique?: Yes    Orientation:  Right  Location:  Radial artery  Catheter size:  20 G  Number of attempts:  1    Assessment:   Post-procedure:  Line secured and sterile dressing applied  Patient Tolerance:  Patient tolerated the procedure well with no immediate complications  Comment:   Collateral perfusion verified. Observed K Andrew SRNA place arterial line.

## 2018-08-10 NOTE — PROGRESS NOTES
8/10/2018    INTENSIVIST PROGRESS NOTE:     Patient seen and evaluated, chart reviewed   58 yo male s/p right CEA  Now pt in CCU in no distress, no acute complaints    ROS: expected post op pain    Visit Vitals    /41 (BP 1 Location: Left arm, BP Patient Position: At rest)    Pulse 64    Temp 97.6 °F (36.4 °C)    Resp 16    Ht 6' (1.829 m)    Wt 113.4 kg (250 lb)    SpO2 97%    BMI 33.91 kg/m2       General: no distress  Eyes: anicteric  HEENT: dry oral mucosa  Neck: FROM, right surgical dressing  CV: RRR  Lungs: clear  Abd: soft  : no flank pain  Ext: no edema  Skin: no rashes  Musculoskeletal: normal inspection  Neuro: non focal    CXR: 8/2 clear    Labs reviewed    A/P:  - s/p R CEA: post op care in ccu overnight  - BP control  - pain control  - advance diet as tolerated  - mobilize  - Will assist on disposition planning when stable for transfer  Trenton Alba MD

## 2018-08-10 NOTE — ADT AUTH CERT NOTES
Utilization Review           Stroke: Ischemic - Care Day 4 (8/10/2018) by Sherron Powell        Review Status Review Entered       Completed 8/10/2018       Details              Care Day: 4 Care Date: 8/10/2018 Level of Care: Telemetry       Guideline Day 3        Clinical Status       (X) * Hemodynamic stability       8/10/2018 9:43 AM EDT by Zeyad Goldstein         146/62, 97.8, 72, 18, 99% RA              (X) * Dangerous arrhythmia absent       8/10/2018 9:43 AM EDT by Zeyad Goldstein         absent              (X) * Mental status at baseline or stable       8/10/2018 9:43 AM EDT by Zeyad Goldstein         baseline              (X) * Neurologic deficits absent or stable       8/10/2018 9:43 AM EDT by Zeyad Goldstein         no issue noted              ( ) * Adequate dietary intake       ( ) * Discharge plans and education understood              Activity       (X) * Up to chair       8/10/2018 9:43 AM EDT by Zeyad Goldstein         as tolerated              (X) * Assisted ambulation as tolerated       8/10/2018 9:43 AM EDT by Zeyad Goldstein         as tolerated                     Routes       ( ) * Oral hydration, medications, diet       8/10/2018 9:43 AM EDT by Zeyad Goldstein         NPO for Vascular surgery today for right CEA                     Medications       (X) Statin       8/10/2018 9:43 AM EDT by Zeyad Goldstein         Lipitor 40mg qd po              (X) Antithrombotic therapy       8/10/2018 9:43 AM EDT by Zeyad Goldstein         Plavix 75mg qd po ASA 81mg qd po              (X) Possible anticoagulation       8/10/2018 9:43 AM EDT by Zeyad Goldstein         Plavix 75mg qd po ASA 81mg qd po                     8/10/2018 9:43 AM EDT by Zeyad Goldstein       Subject: Additional Clinical Information       Meds:         ASA 81mg qd po; Lipitor 40mg qd po; Plavix 75mg qd po; Colestid 2g qd po; Lovenox 40mg q24hrs SC; Lopressor 12.5mg bid po         TIA due to recurrent stroke due to Severe bilateral carotid stenoses, POA         Recent embolic stroke right corona radiata and right parietal lobe 8/3/18         -symptoms resolved, MRI reveals near resolution of all prior damage but one small location         -for surgery with Vascular surgery today for right CEA, if all goes well, for L CEA Monday         -continue with pt/ot         -lipitor continues once tolerating PO again         -bp control with lopressor.  bp stable, resume with PO intake         HTN         -lopressor only for now - consider restarting acei prior to dc or sooner if renal function remains intact         Hyperlipidemia         --continue statin                                                   * Milestone                  Stroke: Ischemic - Care Day 3 (8/9/2018) by Ashley Jacobs        Review Status Review Entered       Completed 8/9/2018       Details              Care Day: 3 Care Date: 8/9/2018 Level of Care: Telemetry       Guideline Day 3        Clinical Status       (X) * Hemodynamic stability       8/9/2018 4:18 PM EDT by Bethany Coffee         130/59, 97.7, 66, 18, 98% RA              (X) * Dangerous arrhythmia absent       8/9/2018 4:18 PM EDT by Birdena Coffee         no issue noted              (X) * Mental status at baseline or stable       8/9/2018 4:18 PM EDT by [facility] Coffee         baseline              (X) * Neurologic deficits absent or stable       8/9/2018 4:18 PM EDT by Birdena Coffee         no issue noted              (X) * Adequate dietary intake       8/9/2018 4:18 PM EDT by Birdena Coffee         cardiac regular diet              ( ) * Discharge plans and education understood              Activity       (X) * Up to chair       8/9/2018 4:18 PM EDT by [facility] Coffee         ad ronin              (X) * Assisted ambulation as tolerated       8/9/2018 4:18 PM EDT by [facility] Coffee         ad ronni                     Routes       (X) * Oral hydration, medications, diet       8/9/2018 4:18 PM EDT by Mike Samples         cardiac regular diet                     Medications       (X) Statin       8/9/2018 4:18 PM EDT by Mike Samples         Lipitor 40mg qd po              (X) Antithrombotic therapy       8/9/2018 4:18 PM EDT by Mike Samples         Plavix 75mg qd po              (X) Possible anticoagulation       8/9/2018 4:18 PM EDT by Mike Samples         Plavix 75mg qd po                     8/9/2018 4:18 PM EDT by Mike Samples       Subject: Additional Clinical Information       Meds:         ASA 81mg qd po; Lipitor 40mg qd po; Ancef 2g on call OR IV; Plavix 75mg qd po; Colestid 2g qd po; Lovenox 40mg q24hrs SC; Lopressor 12.5mg bid po         continue with pt/ot         -lipitor continues         -bp control with lopressor.  bp stable         ãã         HTN         -lopressor only for now - consider restarting acei once decision on surgery noted         ãã         Hyperlipidemia         --continue statin started last week          Prophylaxis: SCD's         Recommended Disposition:ãHome w/Family, SNF/LTC and  PT, OT, RN                                                     * Milestone

## 2018-08-10 NOTE — ROUTINE PROCESS
Bedside and Verbal shift change report given to DEEAPK fontanez(oncoming nurse) by Anh Casillas RN (offgoing nurse). Report included the following information SBAR, Kardex, Recent Results and Med Rec Status.

## 2018-08-10 NOTE — PROGRESS NOTES
Neurology Progress Note    Patient ID:  Kelsi Toth  [id number]  57 y.o.  1955      CHIEF COMPLAINT: Left-sided weakness and numbness both hands    Subjective:      Patient has complaints of left-sided weakness that occurred several days ago, but that has now resolved completely, and he was admitted because of recurring numbness of both hands pretty clearly this looks like carpal tunnel syndrome. Patient to have surgery today and doing fine, no new neurologic symptoms. Patient was admitted to [facility], and carotid Dopplers that showed 80-90% disease, and he had a clear left hemiparesis associated with a TIA that was symptomatic from the right carotid, and an MRI scan today shows only one small punctate area of ischemia left and the patient clinically is completely back to normal.  His CTA shows 95% stenosis of the right internal carotid artery with a soft thrombus probably there and plaque, and I discussed with Dr. Theresa Marino, and I agree he needs surgery urgently because he is a high risk for progression of disease with a stroke in major disability and perhaps morbidity if he goes untreated. [place] we do not know about his follow-up. Because of his severe stenosis and his young age even on the left side, he probably needs that done after the right side been done. His CTA, his MRI scan and MRA all reviewed personally on the PACS system and I agree with reports as dictated.     Current Facility-Administered Medications   Medication Dose Route Frequency    [START ON 8/11/2018] aspirin chewable tablet 81 mg  81 mg Oral DAILY    [START ON 8/11/2018] lisinopril (PRINIVIL, ZESTRIL) tablet 5 mg  5 mg Oral DAILY    0.9% sodium chloride infusion  50 mL/hr IntraVENous CONTINUOUS    acetaminophen (TYLENOL) tablet 650 mg  650 mg Oral Q4H PRN    atorvastatin (LIPITOR) tablet 40 mg  40 mg Oral QHS    cholestyramine-aspartame (QUESTRAN LIGHT) packet 4 g  4 g Oral TID WITH MEALS  metoprolol tartrate (LOPRESSOR) tablet 12.5 mg  12.5 mg Oral BID    naloxone (NARCAN) injection 0.4 mg  0.4 mg IntraVENous PRN    sodium chloride (NS) flush 5-10 mL  5-10 mL IntraVENous Q8H    sodium chloride (NS) flush 5-10 mL  5-10 mL IntraVENous PRN    nicotine (NICODERM CQ) 21 mg/24 hr patch 1 Patch  1 Patch TransDERmal Q24H    morphine injection 2 mg  2 mg IntraVENous Q4H PRN    ondansetron (ZOFRAN) injection 4 mg  4 mg IntraVENous Q4H PRN    PHENYLephrine (ELISSA-SYNEPHRINE) 30 mg in 0.9% sodium chloride 250 mL infusion   mcg/min IntraVENous TITRATE    insulin lispro (HUMALOG) injection   SubCUTAneous AC&HS    dextrose (D50W) injection syrg 12.5-25 g  12.5-25 g IntraVENous PRN    clopidogrel (PLAVIX) tablet 75 mg  75 mg Oral DAILY    sodium chloride (NS) flush 5-10 mL  5-10 mL IntraVENous Q8H    sodium chloride (NS) flush 5-10 mL  5-10 mL IntraVENous PRN    acetaminophen (TYLENOL) tablet 650 mg  650 mg Oral Q4H PRN    ondansetron (ZOFRAN) injection 4 mg  4 mg IntraVENous Q6H PRN    docusate sodium (COLACE) capsule 100 mg  100 mg Oral BID    colestipol (COLESTID) tablet 2 g  2 g Oral DAILY        Past Medical History:   Diagnosis Date    Bilateral carotid artery stenosis     880-99%    Stroke (cerebrum) (HCC) 08/02/2018    multiple embolic stroke on right coronal radiata and right parietal       Past Surgical History:   Procedure Laterality Date    HX CHOLECYSTECTOMY         [unfilled]    Social History   Substance Use Topics    Smoking status: Never Smoker    Smokeless tobacco: Current User      Comment: dips tobacco about 5 times day     Alcohol use Yes       Current Facility-Administered Medications   Medication Dose Route Frequency Provider Last Rate Last Dose    [START ON 8/11/2018] aspirin chewable tablet 81 mg  81 mg Oral DAILY Preston Warner MD        [START ON 8/11/2018] lisinopril (PRINIVIL, ZESTRIL) tablet 5 mg  5 mg Oral DAILY Preston Warner MD        0.9% sodium chloride infusion  50 mL/hr IntraVENous CONTINUOUS Marquise Downs MD 50 mL/hr at 08/10/18 1602 50 mL/hr at 08/10/18 1602    acetaminophen (TYLENOL) tablet 650 mg  650 mg Oral Q4H PRN Marquise Downs MD        atorvastatin (LIPITOR) tablet 40 mg  40 mg Oral QHS Marquise Downs MD        cholestyramine-aspartame (QUESTRAN LIGHT) packet 4 g  4 g Oral TID WITH MEALS Marquise Downs MD   4 g at 08/10/18 1733    metoprolol tartrate (LOPRESSOR) tablet 12.5 mg  12.5 mg Oral BID Marquise Downs MD        naloxone [facility]) injection 0.4 mg  0.4 mg IntraVENous PRN Marquise Downs MD        sodium chloride (NS) flush 5-10 mL  5-10 mL IntraVENous Alexis Forman MD   10 mL at 08/10/18 1727    sodium chloride (NS) flush 5-10 mL  5-10 mL IntraVENous PRN Marquise Downs MD        nicotine (NICODERM CQ) 21 mg/24 hr patch 1 Patch  1 Patch TransDERmal Q24H Marquise Downs MD        morphine injection 2 mg  2 mg IntraVENous Q4H PRN Marquise Downs MD        ondansetron [facility]) injection 4 mg  4 mg IntraVENous Q4H PRN Marquise Downs MD        PHENYLephrine (ELISSA-SYNEPHRINE) 30 mg in 0.9% sodium chloride 250 mL infusion   mcg/min IntraVENous TITRATE Marquise Downs MD        insulin lispro (HUMALOG) injection   SubCUTAneous AC&HS Gabrielle Campo MD   Stopped at 08/08/18 2200    dextrose (D50W) injection syrg 12.5-25 g  12.5-25 g IntraVENous PRN Gabrielle aCmpo MD        clopidogrel (PLAVIX) tablet 75 mg  75 mg Oral DAILY Marquise Downs MD   Stopped at 08/10/18 0900    sodium chloride (NS) flush 5-10 mL  5-10 mL IntraVENous Q8H Percy Pereira MD   10 mL at 08/10/18 1715    sodium chloride (NS) flush 5-10 mL  5-10 mL IntraVENous PRN Percy Pereira MD   10 mL at 08/09/18 0314    acetaminophen (TYLENOL) tablet 650 mg  650 mg Oral Q4H PRN Percy Pereira MD        ondansetron [facility]) injection 4 mg  4 mg IntraVENous Q6H PRN Percy Pereira MD        docusate sodium (COLACE) capsule 100 mg  100 mg Oral BID Korey Fernandez MD   Stopped at 08/10/18 0900    colestipol (COLESTID) tablet 2 g  2 g Oral DAILY Korey Fernandez MD   Stopped at 08/10/18 0900       Allergies   Allergen Reactions    Iodine Hives       Review of Systems:    A comprehensive review of systems was negative except for: Neurological: positive for paresthesia and History of TIA and numbness in his hand suggesting carpal tunnel    Objective:      Objective:     Patient Vitals for the past 24 hrs:   BP Temp Pulse Resp SpO2 Height Weight   08/10/18 1730 - - 62 15 94 % - -   08/10/18 1720 - - 63 20 94 % - -   08/10/18 1700 112/48 97.6 °F (36.4 °C) 64 16 99 % - -   08/10/18 1652 110/49 - 65 19 - - -   08/10/18 1630 103/41 97.6 °F (36.4 °C) 64 16 97 % - -   08/10/18 1615 108/42 - 65 11 99 % - -   08/10/18 1600 114/49 - 64 16 98 % - -   08/10/18 1545 116/44 - 65 15 97 % - -   08/10/18 1540 112/45 - 67 23 97 % - -   08/10/18 1535 112/44 - 66 15 95 % - -   08/10/18 1530 112/43 - 68 17 93 % - -   08/10/18 1525 115/49 97.9 °F (36.6 °C) 72 19 93 % - -   08/10/18 1520 108/51 - - - - - -   08/10/18 1231 129/50 - (!) 59 18 98 % - -   08/10/18 1216 134/50 - 64 15 99 % - -   08/10/18 1200 133/46 - (!) 57 16 99 % - -   08/10/18 1145 129/46 - (!) 59 16 98 % - -   08/10/18 1140 124/50 - (!) 57 16 99 % - -   08/10/18 1135 139/54 - 63 18 98 % - -   08/10/18 1130 - - - - - 6' (1.829 m) 250 lb (113.4 kg)   08/10/18 0744 146/62 97.8 °F (36.6 °C) 72 18 99 % - -   08/10/18 0427 134/63 97.4 °F (36.3 °C) (!) 59 18 97 % - -   08/09/18 2344 123/46 97.9 °F (36.6 °C) 66 18 96 % - -   08/09/18 1908 119/51 97.9 °F (36.6 °C) 64 18 97 % - -         Lab Review   Recent Results (from the past 24 hour(s))   GLUCOSE, POC    Collection Time: 08/09/18  9:12 PM   Result Value Ref Range    Glucose (POC) 112 (H) 65 - 100 mg/dL    Performed by Darshan Ahuja (PCT)    GLUCOSE, POC    Collection Time: 08/10/18  6:35 AM   Result Value Ref Range    Glucose (POC) 107 (H) 65 - 100 mg/dL    Performed by Boom Ceballos (PCT)    GLUCOSE, POC    Collection Time: 08/10/18  3:24 PM   Result Value Ref Range    Glucose (POC) 107 (H) 65 - 100 mg/dL    Performed by Yuri Santiago            Additional comments:I personally viewed and interpreted the patient's CTA, MRI and MRAs all reviewed personally on the PACS system    NEUROLOGICAL EXAM:     Appearance: The patient is well developed, well nourished, provides a coherent history and is in no acute distress. Mental Status: Oriented to time, place and person, and the president, cognitive function is normal and speech is fluent and no aphasia or dysarthria. Mood and affect appropriate. Cranial Nerves:   Intact visual fields. Fundi are benign. DIAN, EOM's full, no nystagmus, no ptosis. Facial sensation is normal. Corneal reflexes are not tested. Facial movement is symmetric. Hearing is normal bilaterally. Palate is midline with normal sternocleidomastoid and trapezius muscles are normal. Tongue is midline. Neck without meningismus or bruits  Temporal arteries are not tender or enlarged   Motor:  5/5 strength in upper and lower proximal and distal muscles. Normal bulk and tone. No fasciculations. Reflexes:   Deep tendon reflexes 2+/4 and symmetrical.  No babinski or clonus present  He has Tinel's over both median nerves at the wrists   Sensory:   Normal to touch, pinprick and vibration. DSS is intact   Gait:  Normal gait. Tremor:   No tremor noted. Cerebellar:  No cerebellar signs present. Neurovascular:  Normal heart sounds and regular rhythm, peripheral pulses decreased, and no carotid bruits.                    Assessment:        Assessment:       ICD-10-CM ICD-9-CM    1. Bilateral carotid artery stenosis I65.23 433.10      433.30    2. Bilateral carpal tunnel syndrome G56.03 354.0    3. Cerebrovascular accident (CVA), unspecified mechanism ([facility] [address] I63.9 434.91    4. Transient ischemic attack involving right internal carotid artery G45.1 435.8    5.  Transient cerebral ischemia, unspecified type G45.9 435.9      Active Problems:    Stroke (cerebrum) ([facility]) (8/7/2018)      Bilateral carpal tunnel syndrome (8/7/2018)      Transient ischemic attack involving right internal carotid artery (8/7/2018)      Bilateral carotid artery stenosis (8/7/2018)      Carotid artery stenosis with cerebral infarction [facility]) (8/10/2018)        Plan:     Patient has surgery, doing well, no neurologic symptoms, no new neurologic symptoms, we will follow as needed, we can help  Patient has complaints of left-sided weakness that occurred several days ago, but that has now resolved completely, and he was admitted because of recurring numbness of both hands pretty clearly this looks like carpal tunnel syndrome. Patient was admitted to [facility], and carotid Dopplers that showed 80-90% disease, and he had a clear left hemiparesis associated with a TIA that was symptomatic from the right carotid, and an MRI scan today shows only one small punctate area of ischemia left and the patient clinically is completely back to normal.  His CTA shows 95% stenosis of the right internal carotid artery with a soft thrombus probably there and plaque, and I discussed with Dr. Brian Mark, and I agree he needs surgery urgently because he is a high risk for progression of disease with a stroke in major disability and perhaps morbidity if he goes untreated. [place] and we do not know about his follow-up. Because of his severe stenosis and his young age even on the left side, he probably needs that done after the right side been done. His CTA, his MRI scan and MRA all reviewed personally on the PACS system and I agree with reports as dictated.   Surgery on Friday, patient doing well, continue current therapy      Signed:  Bobo Fields MD  8/10/2018  9 AM    None  None

## 2018-08-10 NOTE — PROGRESS NOTES
1635:  TRANSFER - IN REPORT:    Verbal report received from Cain Hill RN on Krupa Germain  being received from [facility] for routine post - op      Report consisted of patients Situation, Background, Assessment and   Recommendations(SBAR). Information from the following report(s) SBAR, Kardex, OR Summary and Intake/Output was reviewed with the receiving nurse. Opportunity for questions and clarification was provided. Assessment completed upon patients arrival to unit and care assumed. 1650:  Patient received to unit. VSS, remains on nicardipine drip at 5 mg/hr. Patient oriented x 4, cranial nerve assessment WNL, RASS -1/0. Small amount of sanguinous drainage noted on incision dressing; ANGELICA patent and charged. Skin assessment completed; no areas of breakdown noted. Foam dressing remains on patient's coccyx. 1754:  1 mg morphine given for 5/10 incisional pain. 1835:  Patient's systolic blood pressure maintaining above 150 via arterial line. Patient's nicardepine drip discontinued prior to transfer. Spoke with Dr. Sourav Delaney, orders received to maintain SBP between 100 and 160 with nicardipine. 1910:  Bedside handoff report given to Monica Patricia RN (oncoming nurse).       Vince Nguyen RN

## 2018-08-11 LAB
GLUCOSE BLD STRIP.AUTO-MCNC: 112 MG/DL (ref 65–100)
GLUCOSE BLD STRIP.AUTO-MCNC: 118 MG/DL (ref 65–100)
GLUCOSE BLD STRIP.AUTO-MCNC: 120 MG/DL (ref 65–100)
GLUCOSE BLD STRIP.AUTO-MCNC: 133 MG/DL (ref 65–100)
SERVICE CMNT-IMP: ABNORMAL

## 2018-08-11 PROCEDURE — 82962 GLUCOSE BLOOD TEST: CPT

## 2018-08-11 PROCEDURE — 74011250637 HC RX REV CODE- 250/637: Performed by: HOSPITALIST

## 2018-08-11 PROCEDURE — 65270000029 HC RM PRIVATE

## 2018-08-11 PROCEDURE — 74011250636 HC RX REV CODE- 250/636: Performed by: SURGERY

## 2018-08-11 PROCEDURE — 74011250637 HC RX REV CODE- 250/637: Performed by: SURGERY

## 2018-08-11 RX ADMIN — METOPROLOL TARTRATE 12.5 MG: 25 TABLET ORAL at 07:31

## 2018-08-11 RX ADMIN — METOPROLOL TARTRATE 12.5 MG: 25 TABLET ORAL at 17:41

## 2018-08-11 RX ADMIN — Medication 10 ML: at 15:53

## 2018-08-11 RX ADMIN — MUPIROCIN: 20 OINTMENT TOPICAL at 00:03

## 2018-08-11 RX ADMIN — ACETAMINOPHEN 650 MG: 325 TABLET ORAL at 15:53

## 2018-08-11 RX ADMIN — CHOLESTYRAMINE 4 G: 4 POWDER, FOR SUSPENSION ORAL at 07:41

## 2018-08-11 RX ADMIN — MUPIROCIN: 20 OINTMENT TOPICAL at 09:27

## 2018-08-11 RX ADMIN — CLOPIDOGREL BISULFATE 75 MG: 75 TABLET ORAL at 07:34

## 2018-08-11 RX ADMIN — MORPHINE SULFATE 2 MG: 2 INJECTION, SOLUTION INTRAMUSCULAR; INTRAVENOUS at 07:26

## 2018-08-11 RX ADMIN — LISINOPRIL 5 MG: 5 TABLET ORAL at 07:32

## 2018-08-11 RX ADMIN — CHOLESTYRAMINE 4 G: 4 POWDER, FOR SUSPENSION ORAL at 18:53

## 2018-08-11 RX ADMIN — Medication 10 ML: at 06:34

## 2018-08-11 RX ADMIN — DOCUSATE SODIUM 100 MG: 100 CAPSULE, LIQUID FILLED ORAL at 07:40

## 2018-08-11 RX ADMIN — CHOLESTYRAMINE 4 G: 4 POWDER, FOR SUSPENSION ORAL at 12:00

## 2018-08-11 RX ADMIN — DOCUSATE SODIUM 100 MG: 100 CAPSULE, LIQUID FILLED ORAL at 17:41

## 2018-08-11 RX ADMIN — ASPIRIN 81 MG CHEWABLE TABLET 81 MG: 81 TABLET CHEWABLE at 07:33

## 2018-08-11 RX ADMIN — DOCUSATE SODIUM 100 MG: 100 CAPSULE, LIQUID FILLED ORAL at 07:29

## 2018-08-11 RX ADMIN — Medication 10 ML: at 22:38

## 2018-08-11 RX ADMIN — ACETAMINOPHEN 650 MG: 325 TABLET ORAL at 22:37

## 2018-08-11 RX ADMIN — ATORVASTATIN CALCIUM 40 MG: 40 TABLET, FILM COATED ORAL at 22:37

## 2018-08-11 RX ADMIN — COLESTIPOL HYDROCHLORIDE 2 G: 1 TABLET, FILM COATED ORAL at 07:40

## 2018-08-11 RX ADMIN — ACETAMINOPHEN 650 MG: 325 TABLET ORAL at 07:41

## 2018-08-11 NOTE — PROGRESS NOTES
0115  Patient received at 2100 hours last evening from Jack Tadeo, [address] [place] [address]; patient on room air; no respiratory distress; patient alert, oriented, moving all extremities; cooperative; complaint of pain in neck (surgery site) and patient given morphine 2 mg IV one time; speech clear; no numbness or tingling of face or extremities; tongue midline; dressing right neck intact with ANGELICA drain; small amount of old blood drainage on dressing; in sinus erlin; patient has two peripheral IV sites; infusing NS at 50 cc/hr and cardene drip to maintain systolic BP less than 768; palpable pulses present; on clear liquid diet; no nausea, no emesis; patient has voided one time this shift karolyn color urine; no family at bedside;    0630   Patient slept a few hours tonight; no further complaint of pain; on room air; no nausea, no emesis; ANGELICA drain put out 17 cc this shift; patient voided 450 cc of urine this shift; no contact with family; cardene drip weaned off at 0207 hours this AM;     0730   Report given to [facility], RN; right neck dressing removed (steristrips still in place) and right neck ANGELICA drain removed;

## 2018-08-11 NOTE — PROGRESS NOTES
Problem: Falls - Risk of  Goal: *Absence of Falls  Document Miroslava Fall Risk and appropriate interventions in the flowsheet. Outcome: Progressing Towards Goal  Fall Risk Interventions:  Mobility Interventions: Assess mobility with egress test, Communicate number of staff needed for ambulation/transfer, Bed/chair exit alarm, Mechanical lift, OT consult for ADLs    Mentation Interventions: Adequate sleep, hydration, pain control, Bed/chair exit alarm, Door open when patient unattended, Evaluate medications/consider consulting pharmacy, Eyeglasses and hearing aids    Medication Interventions: Assess postural VS orthostatic hypotension, Bed/chair exit alarm, Evaluate medications/consider consulting pharmacy, Patient to call before getting OOB    Elimination Interventions: Bed/chair exit alarm, Call light in reach, Elevated toilet seat, Patient to call for help with toileting needs, Toilet paper/wipes in reach    History of Falls Interventions: Bed/chair exit alarm, Consult care management for discharge planning, Door open when patient unattended, Evaluate medications/consider consulting pharmacy        Problem: Pressure Injury - Risk of  Goal: *Prevention of pressure injury  Document Sebas Scale and appropriate interventions in the flowsheet.    Outcome: Progressing Towards Goal  Pressure Injury Interventions:  Sensory Interventions: Assess changes in LOC, Avoid rigorous massage over bony prominences, Assess need for specialty bed, Chair cushion, Check visual cues for pain         Activity Interventions: Assess need for specialty bed, Chair cushion, Increase time out of bed, Pressure redistribution bed/mattress(bed type)    Mobility Interventions: Assess need for specialty bed, Chair cushion, Float heels, HOB 30 degrees or less         Friction and Shear Interventions: Apply protective barrier, creams and emollients, Foam dressings/transparent film/skin sealants, Feet elevated on foot rest, HOB 30 degrees or less

## 2018-08-11 NOTE — PROGRESS NOTES
8/11/2018    INTENSIVIST PROGRESS NOTE:     Patient seen and evaluated, chart reviewed   56 yo male s/p right CEA  Now pt in CCU in no distress, no acute complaints  Good spirits   Denies speech or motor abnormalities  Denies S/S CAMERON despite at risk body habitus  ROS: expected post op pain    Visit Vitals    BP (!) 106/20    Pulse 70    Temp 98.9 °F (37.2 °C)    Resp 21    Ht 6' (1.829 m)    Wt 113.4 kg (250 lb)    SpO2 97%    BMI 33.91 kg/m2       General: no distress  Eyes: anicteric  HEENT: dry oral mucosa  Neck: FROM, right surgical dressing  CV: RRR  Lungs: clear  Abd: soft  : no flank pain  Ext: no edema  Skin: no rashes  Musculoskeletal: normal inspection  Neuro: non focal    CXR: 8/2 clear    Labs reviewedResults for Donell Vines (MRN [id number]) as of 8/11/2018 12:10   Ref. Range 8/7/2018 11:20   Sodium Latest Ref Range: 136 - 145 mmol/L 139   Potassium Latest Ref Range: 3.5 - 5.1 mmol/L 4.3   Chloride Latest Ref Range: 97 - 108 mmol/L 103   CO2 Latest Ref Range: 21 - 32 mmol/L 28   Anion gap Latest Ref Range: 5 - 15 mmol/L 8   Glucose Latest Ref Range: 65 - 100 mg/dL 88   BUN Latest Ref Range: 6 - 20 MG/DL 14   Creatinine Latest Ref Range: 0.70 - 1.30 MG/DL 0.99   BUN/Creatinine ratio Latest Ref Range: 12 - 20   14   Calcium Latest Ref Range: 8.5 - 10.1 MG/DL 8.7   Magnesium Latest Ref Range: 1.6 - 2.4 mg/dL 2.1   GFR est non-AA Latest Ref Range: >60 ml/min/1.73m2 >60   GFR est AA Latest Ref Range: >60 ml/min/1.73m2 >60   Bilirubin, total Latest Ref Range: 0.2 - 1.0 MG/DL 1.0   Protein, total Latest Ref Range: 6.4 - 8.2 g/dL 8.0   Albumin Latest Ref Range: 3.5 - 5.0 g/dL 3.8   Globulin Latest Ref Range: 2.0 - 4.0 g/dL 4.2 (H)   A-G Ratio Latest Ref Range: 1.1 - 2.2   0.9 (L)   ALT (SGPT) Latest Ref Range: 12 - 78 U/L 33   AST Latest Ref Range: 15 - 37 U/L 16   Alk.  phosphatase Latest Ref Range: 45 - 117 U/L 86   CK Latest Ref Range: 39 - 308 U/L 60   CK - MB Latest Ref Range: <3.6 NG/ML <1.0   CK-MB Index Latest Ref Range: 0 - 2.5   Cannot be calculated   Troponin-I, Qt.  Latest Ref Range: <0.05 ng/mL <0.05         A/P:  - s/p R CEA: post op care in ccu overnight  - BP control  - pain control  - advance diet as tolerated  - mobilize  - Will assist on disposition planning when stable for transfer  Diego Sifuentes MD

## 2018-08-11 NOTE — PROGRESS NOTES
1900- Report received from Francis Bundy 411. Patient and chart visited. A&O x4, negative neuro assessment. 2137- Report given to [facility].

## 2018-08-11 NOTE — PROGRESS NOTES
0700:  Bedside and Verbal shift change report given to [employer] (oncoming nurse) by Dk Orellana (offgoing nurse). Report included the following information SBAR, Kardex, Procedure Summary, Intake/Output, MAR, Recent Results and Cardiac Rhythm SR-SB.   0800:  AM assessment. ANGELICA drain removed by Dk Orellana, PM RN.   1000:  Dr Up Brandenburg in to assess. 1200:  Noon assessment. 1300:  Removed A-line. 1500: Minimal assist up to chair. 1530: TRANSFER - OUT REPORT:  Verbal report given to Meenakshi(name) on Shirley Bevel  being transferred to room 2105 Med Surg(unit) for routine progression of care       Report consisted of patients Situation, Background, Assessment and   Recommendations(SBAR). Information from the following report(s) SBAR, Kardex, Procedure Summary, Intake/Output, MAR, Recent Results and Cardiac Rhythm SR was reviewed with the receiving nurse. Lines:   Peripheral IV 08/07/18 Right Antecubital (Active)   Site Assessment Clean, dry, & intact 8/11/2018 12:00 PM   Phlebitis Assessment 0 8/11/2018 12:00 PM   Infiltration Assessment 0 8/11/2018 12:00 PM   Dressing Status Clean, dry, & intact 8/11/2018 12:00 PM   Dressing Type Tape;Transparent 8/11/2018 12:00 PM   Hub Color/Line Status Green;Capped 8/11/2018 12:00 PM   Action Taken Blood drawn 8/7/2018 11:20 AM   Alcohol Cap Used Yes 8/11/2018 12:00 PM       Peripheral IV 08/10/18 Left Hand (Active)   Site Assessment Clean, dry, & intact 8/11/2018 12:00 PM   Phlebitis Assessment 0 8/11/2018 12:00 PM   Infiltration Assessment 0 8/11/2018 12:00 PM   Dressing Status Clean, dry, & intact 8/11/2018 12:00 PM   Dressing Type Tape;Transparent 8/11/2018 12:00 PM   Hub Color/Line Status Green;Capped 8/11/2018 12:00 PM   Action Taken Open ports on tubing capped 8/11/2018 12:00 PM   Alcohol Cap Used Yes 8/11/2018 12:00 PM     Opportunity for questions and clarification was provided.    Patient transported with:   Registered Nurse  Tech   1600:  Transfer via wheelchair and RN to room 2105. Paulette Garcia, receiving RN, aware patient has arrived to room.

## 2018-08-11 NOTE — OP NOTES
[facility]  OPERATIVE REPORT    Corbin Mena  MR#: [id number]  : 1955  ACCOUNT #: [de-identified]   DATE OF SERVICE: 08/10/2018    PREOPERATIVE DIAGNOSIS:  Symptomatic high-grade right carotid stenosis. POSTOPERATIVE DIAGNOSIS:  Symptomatic high-grade right carotid stenosis. PROCEDURES PERFORMED:  Right carotid endarterectomy, Dacron patch    SURGEON:  Emmie Coronado MD    ANESTHESIA:  General.    ESTIMATED BLOOD LOSS:  minimal    SPECIMENS REMOVED:   plaque    COMPLICATIONS:  None. ANESTHESIA:  General.    INDICATIONS:  The patient is a 75-year-old gentleman who is from [place] but is working transiently in the [place] [facility] when he developed right hemispheric neurologic symptoms and sought medical care. He was noted to have small punctate acute infarcts throughout the right hemisphere and a very high-grade right carotid stenosis with loose debris and an ulcerative plaque. As he was completely neurologically intact and all of the hemispheric lesions were tiny, decision was made in conjunction with his referring neurologist to proceed with acute endarterectomy. PROCEDURE:  Following obtaining informed consent, the patient placed supine on the operating table. After adequate induction of general anesthesia, site and patient confirmation, confirmation of prophylactic antibiotics, the right neck was prepped and draped in the usual sterile fashion. An incision was made along the anterior border of the sternocleidomastoid, deepened to and through the platysma. Crossing facial veins were doubly ligated and divided. The common carotid was identified and controlled at the level of the omohyoid. Dissection was continued cephalad until the bifurcation was identified, dissected free and controlled. Tenth and twelfth cranial nerves were both identified and care taken to avoid their injury. The internal, external thyroid branch and common carotids were all controlled.   The patient was systemically heparinized. Arteriotomy was begun in the common carotid and extended through the area of very heavy disease at the bulb and extending several centimeters onto the internal carotid. Ultimately, a disease free area in the internal carotid was achieved. A Naga shunt was placed, held in position with Naga shunt clamps. Endarterectomy was begun on the common carotid and extended cephalad to occlusion with eversion endarterectomy of the external carotid and finally a directly visualized  endarterectomy of the internal carotid with a satisfactory endpoint. The endarterectomized segment was meticulously inspected for loose debris. The arteriotomy was closed with a Dacron patch using 2 continuous Prolene sutures. Just prior to completion of the patch angioplasty Naga shunt was removed. The endarterectomized segment was again irrigated and evacuated and the patch arteriotomy completed. Flow was initially restored at the external carotid and then ultimately at the internal carotid vessel. Patient tolerated the procedure well. Flow was verified with a handheld Doppler. The wound was deemed hemostatic and closed in 2 layers over a closed suction drain. Skin was closed with running subcuticular stitch. The patient tolerated the procedure well. No complications.       MD JAMIE Davis / PUSHPA  D: 08/10/2018 15:47     T: 08/11/2018 04:56  JOB #: [id number]  CC: James Eagle MD

## 2018-08-11 NOTE — PROGRESS NOTES
Follow up visit in 2530. Pt awake and alert, appeared to be in positive spirits, self-reported the same. Pt who is from [place] but working on a job locally is receiving good support from his co-workers. Pt identified as someone who practices linda in God and promotes a positive attitude. Affirmed these while providing active listening. Pt will have another procedure on Monday and hopefully be discharged a few days later. Pleased with care received and feeling like he's recovering well. Extended continued blessings and advised of availability. No stated needs at this time. HAO Alejandre. Div

## 2018-08-12 ENCOUNTER — ANESTHESIA EVENT (OUTPATIENT)
Dept: SURGERY | Age: 63
DRG: 038 | End: 2018-08-12
Payer: COMMERCIAL

## 2018-08-12 LAB
ANION GAP SERPL CALC-SCNC: 6 MMOL/L (ref 5–15)
BUN SERPL-MCNC: 14 MG/DL (ref 6–20)
BUN/CREAT SERPL: 17 (ref 12–20)
CALCIUM SERPL-MCNC: 8.3 MG/DL (ref 8.5–10.1)
CHLORIDE SERPL-SCNC: 107 MMOL/L (ref 97–108)
CO2 SERPL-SCNC: 26 MMOL/L (ref 21–32)
CREAT SERPL-MCNC: 0.84 MG/DL (ref 0.7–1.3)
ERYTHROCYTE [DISTWIDTH] IN BLOOD BY AUTOMATED COUNT: 12.3 % (ref 11.5–14.5)
GLUCOSE BLD STRIP.AUTO-MCNC: 161 MG/DL (ref 65–100)
GLUCOSE SERPL-MCNC: 128 MG/DL (ref 65–100)
HCT VFR BLD AUTO: 36.6 % (ref 36.6–50.3)
HGB BLD-MCNC: 12.2 G/DL (ref 12.1–17)
MCH RBC QN AUTO: 31 PG (ref 26–34)
MCHC RBC AUTO-ENTMCNC: 33.3 G/DL (ref 30–36.5)
MCV RBC AUTO: 92.9 FL (ref 80–99)
NRBC # BLD: 0 K/UL (ref 0–0.01)
NRBC BLD-RTO: 0 PER 100 WBC
PLATELET # BLD AUTO: 219 K/UL (ref 150–400)
PMV BLD AUTO: 10.1 FL (ref 8.9–12.9)
POTASSIUM SERPL-SCNC: 3.9 MMOL/L (ref 3.5–5.1)
RBC # BLD AUTO: 3.94 M/UL (ref 4.1–5.7)
SERVICE CMNT-IMP: ABNORMAL
SODIUM SERPL-SCNC: 139 MMOL/L (ref 136–145)
WBC # BLD AUTO: 11.4 K/UL (ref 4.1–11.1)

## 2018-08-12 PROCEDURE — 85027 COMPLETE CBC AUTOMATED: CPT | Performed by: SURGERY

## 2018-08-12 PROCEDURE — 82962 GLUCOSE BLOOD TEST: CPT

## 2018-08-12 PROCEDURE — 74011250637 HC RX REV CODE- 250/637: Performed by: SURGERY

## 2018-08-12 PROCEDURE — 36415 COLL VENOUS BLD VENIPUNCTURE: CPT | Performed by: SURGERY

## 2018-08-12 PROCEDURE — 65270000029 HC RM PRIVATE

## 2018-08-12 PROCEDURE — 74011250637 HC RX REV CODE- 250/637: Performed by: HOSPITALIST

## 2018-08-12 PROCEDURE — 80048 BASIC METABOLIC PNL TOTAL CA: CPT | Performed by: SURGERY

## 2018-08-12 RX ADMIN — METOPROLOL TARTRATE 12.5 MG: 25 TABLET ORAL at 09:47

## 2018-08-12 RX ADMIN — METOPROLOL TARTRATE 12.5 MG: 25 TABLET ORAL at 17:44

## 2018-08-12 RX ADMIN — Medication 10 ML: at 15:02

## 2018-08-12 RX ADMIN — DOCUSATE SODIUM 100 MG: 100 CAPSULE, LIQUID FILLED ORAL at 17:44

## 2018-08-12 RX ADMIN — Medication 10 ML: at 21:58

## 2018-08-12 RX ADMIN — CLOPIDOGREL BISULFATE 75 MG: 75 TABLET ORAL at 09:47

## 2018-08-12 RX ADMIN — ASPIRIN 81 MG CHEWABLE TABLET 81 MG: 81 TABLET CHEWABLE at 11:02

## 2018-08-12 RX ADMIN — Medication 10 ML: at 09:49

## 2018-08-12 RX ADMIN — DOCUSATE SODIUM 100 MG: 100 CAPSULE, LIQUID FILLED ORAL at 09:47

## 2018-08-12 RX ADMIN — ATORVASTATIN CALCIUM 40 MG: 40 TABLET, FILM COATED ORAL at 21:57

## 2018-08-12 RX ADMIN — COLESTIPOL HYDROCHLORIDE 2 G: 1 TABLET, FILM COATED ORAL at 09:47

## 2018-08-12 RX ADMIN — ACETAMINOPHEN 650 MG: 325 TABLET ORAL at 21:57

## 2018-08-12 RX ADMIN — ACETAMINOPHEN 650 MG: 325 TABLET ORAL at 09:55

## 2018-08-12 RX ADMIN — CHOLESTYRAMINE 4 G: 4 POWDER, FOR SUSPENSION ORAL at 09:47

## 2018-08-12 RX ADMIN — LISINOPRIL 5 MG: 5 TABLET ORAL at 09:47

## 2018-08-12 RX ADMIN — CHOLESTYRAMINE 4 G: 4 POWDER, FOR SUSPENSION ORAL at 11:05

## 2018-08-12 RX ADMIN — CHOLESTYRAMINE 4 G: 4 POWDER, FOR SUSPENSION ORAL at 17:44

## 2018-08-12 NOTE — PROGRESS NOTES
PT note:    Orders received and acknowledged. Chart reviewed and cleared by nursing. Spoke with patient who reports he has been up ad ronni to the bathroom and feeling at his baseline except for a headache. Noted patient undergoing L CEA tomorrow. Will follow up for PT re-evaluation after tomorrow's surgery.      Loretta Powell, PT, DPT   Total time spent: 8 minutes

## 2018-08-12 NOTE — ROUTINE PROCESS
Bedside shift change report given to Vidya Hubbard (oncoming nurse) by Scotty Snellen (offgoing nurse). Report included the following information SBAR, Kardex, Intake/Output, MAR, Accordion and Recent Results.

## 2018-08-12 NOTE — PROGRESS NOTES
Occupational Therapy  OT consult received, chart reviewed. Spoke with patient who reports he has been up ad ronni to the bathroom and feeling at his baseline except for a headache. Noted patient undergoing L CEA tomorrow. Will follow up for OT Re-evaluation after surgery.

## 2018-08-13 ENCOUNTER — ANESTHESIA (OUTPATIENT)
Dept: SURGERY | Age: 63
DRG: 038 | End: 2018-08-13
Payer: COMMERCIAL

## 2018-08-13 LAB
GLUCOSE BLD STRIP.AUTO-MCNC: 104 MG/DL (ref 65–100)
GLUCOSE BLD STRIP.AUTO-MCNC: 159 MG/DL (ref 65–100)
SERVICE CMNT-IMP: ABNORMAL
SERVICE CMNT-IMP: ABNORMAL

## 2018-08-13 PROCEDURE — 74011000250 HC RX REV CODE- 250: Performed by: SURGERY

## 2018-08-13 PROCEDURE — 03UL0JZ SUPPLEMENT LEFT INTERNAL CAROTID ARTERY WITH SYNTHETIC SUBSTITUTE, OPEN APPROACH: ICD-10-PCS | Performed by: SURGERY

## 2018-08-13 PROCEDURE — 65610000006 HC RM INTENSIVE CARE

## 2018-08-13 PROCEDURE — 88311 DECALCIFY TISSUE: CPT | Performed by: SURGERY

## 2018-08-13 PROCEDURE — 77030020782 HC GWN BAIR PAWS FLX 3M -B

## 2018-08-13 PROCEDURE — 77030020061 HC IV BLD WRMR ADMIN SET 3M -B: Performed by: NURSE ANESTHETIST, CERTIFIED REGISTERED

## 2018-08-13 PROCEDURE — 82962 GLUCOSE BLOOD TEST: CPT

## 2018-08-13 PROCEDURE — 74011000258 HC RX REV CODE- 258

## 2018-08-13 PROCEDURE — 74011250636 HC RX REV CODE- 250/636

## 2018-08-13 PROCEDURE — 74011250636 HC RX REV CODE- 250/636: Performed by: SURGERY

## 2018-08-13 PROCEDURE — 74011250637 HC RX REV CODE- 250/637: Performed by: SURGERY

## 2018-08-13 PROCEDURE — 77030014008 HC SPNG HEMSTAT J&J -C: Performed by: SURGERY

## 2018-08-13 PROCEDURE — 03CL0ZZ EXTIRPATION OF MATTER FROM LEFT INTERNAL CAROTID ARTERY, OPEN APPROACH: ICD-10-PCS | Performed by: SURGERY

## 2018-08-13 PROCEDURE — 88304 TISSUE EXAM BY PATHOLOGIST: CPT | Performed by: SURGERY

## 2018-08-13 PROCEDURE — 77030011640 HC PAD GRND REM COVD -A: Performed by: SURGERY

## 2018-08-13 PROCEDURE — 74011000272 HC RX REV CODE- 272: Performed by: SURGERY

## 2018-08-13 PROCEDURE — 77030013965 HC SHNT CAR JAV BARD -B: Performed by: SURGERY

## 2018-08-13 PROCEDURE — 77030012406 HC DRN WND PENRS BARD -A: Performed by: SURGERY

## 2018-08-13 PROCEDURE — 77030008771 HC TU NG SALEM SUMP -A: Performed by: NURSE ANESTHETIST, CERTIFIED REGISTERED

## 2018-08-13 PROCEDURE — C1768 GRAFT, VASCULAR: HCPCS | Performed by: SURGERY

## 2018-08-13 PROCEDURE — 76210000016 HC OR PH I REC 1 TO 1.5 HR: Performed by: SURGERY

## 2018-08-13 PROCEDURE — 77030031139 HC SUT VCRL2 J&J -A: Performed by: SURGERY

## 2018-08-13 PROCEDURE — 74011250636 HC RX REV CODE- 250/636: Performed by: ANESTHESIOLOGY

## 2018-08-13 PROCEDURE — 77030002933 HC SUT MCRYL J&J -A: Performed by: SURGERY

## 2018-08-13 PROCEDURE — 77030020153 HC PRB DOPLR DISP MIZU -C: Performed by: SURGERY

## 2018-08-13 PROCEDURE — 74011250636 HC RX REV CODE- 250/636: Performed by: INTERNAL MEDICINE

## 2018-08-13 PROCEDURE — 77030002996 HC SUT SLK J&J -A: Performed by: SURGERY

## 2018-08-13 PROCEDURE — 76010000172 HC OR TIME 2.5 TO 3 HR INTENSV-TIER 1: Performed by: SURGERY

## 2018-08-13 PROCEDURE — 77030019908 HC STETH ESOPH SIMS -A: Performed by: NURSE ANESTHETIST, CERTIFIED REGISTERED

## 2018-08-13 PROCEDURE — 77030026438 HC STYL ET INTUB CARD -A: Performed by: NURSE ANESTHETIST, CERTIFIED REGISTERED

## 2018-08-13 PROCEDURE — 77030020256 HC SOL INJ NACL 0.9%  500ML: Performed by: SURGERY

## 2018-08-13 PROCEDURE — 77030021678 HC GLIDESCP STAT DISP VERT -B: Performed by: NURSE ANESTHETIST, CERTIFIED REGISTERED

## 2018-08-13 PROCEDURE — 76060000036 HC ANESTHESIA 2.5 TO 3 HR: Performed by: SURGERY

## 2018-08-13 PROCEDURE — 77030014366 HC DRN WND BNTM -A: Performed by: SURGERY

## 2018-08-13 PROCEDURE — 77030013567 HC DRN WND RESERV BARD -A: Performed by: SURGERY

## 2018-08-13 PROCEDURE — 77030002986 HC SUT PROL J&J -A: Performed by: SURGERY

## 2018-08-13 PROCEDURE — 77030002987 HC SUT PROL J&J -B: Performed by: SURGERY

## 2018-08-13 PROCEDURE — 77030013079 HC BLNKT BAIR HGGR 3M -A: Performed by: NURSE ANESTHETIST, CERTIFIED REGISTERED

## 2018-08-13 PROCEDURE — 74011000258 HC RX REV CODE- 258: Performed by: SURGERY

## 2018-08-13 PROCEDURE — 77030002916 HC SUT ETHLN J&J -A: Performed by: SURGERY

## 2018-08-13 PROCEDURE — 74011000250 HC RX REV CODE- 250

## 2018-08-13 PROCEDURE — 77030018719 HC DRSG PTCH ANTIMIC J&J -A: Performed by: SURGERY

## 2018-08-13 PROCEDURE — 77030008684 HC TU ET CUF COVD -B: Performed by: NURSE ANESTHETIST, CERTIFIED REGISTERED

## 2018-08-13 RX ORDER — PROPOFOL 10 MG/ML
INJECTION, EMULSION INTRAVENOUS AS NEEDED
Status: DISCONTINUED | OUTPATIENT
Start: 2018-08-13 | End: 2018-08-13 | Stop reason: HOSPADM

## 2018-08-13 RX ORDER — HYDRALAZINE HYDROCHLORIDE 20 MG/ML
10 INJECTION INTRAMUSCULAR; INTRAVENOUS
Status: DISCONTINUED | OUTPATIENT
Start: 2018-08-13 | End: 2018-08-13

## 2018-08-13 RX ORDER — HYDRALAZINE HYDROCHLORIDE 20 MG/ML
10 INJECTION INTRAMUSCULAR; INTRAVENOUS
Status: COMPLETED | OUTPATIENT
Start: 2018-08-13 | End: 2018-08-13

## 2018-08-13 RX ORDER — SODIUM CHLORIDE, SODIUM LACTATE, POTASSIUM CHLORIDE, CALCIUM CHLORIDE 600; 310; 30; 20 MG/100ML; MG/100ML; MG/100ML; MG/100ML
INJECTION, SOLUTION INTRAVENOUS
Status: DISCONTINUED | OUTPATIENT
Start: 2018-08-13 | End: 2018-08-13 | Stop reason: HOSPADM

## 2018-08-13 RX ORDER — FENTANYL CITRATE 50 UG/ML
INJECTION, SOLUTION INTRAMUSCULAR; INTRAVENOUS AS NEEDED
Status: DISCONTINUED | OUTPATIENT
Start: 2018-08-13 | End: 2018-08-13 | Stop reason: HOSPADM

## 2018-08-13 RX ORDER — SUCCINYLCHOLINE CHLORIDE 20 MG/ML
INJECTION INTRAMUSCULAR; INTRAVENOUS AS NEEDED
Status: DISCONTINUED | OUTPATIENT
Start: 2018-08-13 | End: 2018-08-13 | Stop reason: HOSPADM

## 2018-08-13 RX ORDER — HYDRALAZINE HYDROCHLORIDE 20 MG/ML
20 INJECTION INTRAMUSCULAR; INTRAVENOUS
Status: DISCONTINUED | OUTPATIENT
Start: 2018-08-13 | End: 2018-08-15 | Stop reason: HOSPADM

## 2018-08-13 RX ORDER — SODIUM CHLORIDE 0.9 % (FLUSH) 0.9 %
5-10 SYRINGE (ML) INJECTION AS NEEDED
Status: DISCONTINUED | OUTPATIENT
Start: 2018-08-13 | End: 2018-08-15 | Stop reason: HOSPADM

## 2018-08-13 RX ORDER — LISINOPRIL 5 MG/1
10 TABLET ORAL DAILY
Status: DISCONTINUED | OUTPATIENT
Start: 2018-08-14 | End: 2018-08-15 | Stop reason: HOSPADM

## 2018-08-13 RX ORDER — FENTANYL CITRATE 50 UG/ML
25 INJECTION, SOLUTION INTRAMUSCULAR; INTRAVENOUS
Status: DISCONTINUED | OUTPATIENT
Start: 2018-08-13 | End: 2018-08-13 | Stop reason: HOSPADM

## 2018-08-13 RX ORDER — SODIUM CHLORIDE 9 MG/ML
25 INJECTION, SOLUTION INTRAVENOUS CONTINUOUS
Status: DISCONTINUED | OUTPATIENT
Start: 2018-08-13 | End: 2018-08-14

## 2018-08-13 RX ORDER — METOPROLOL TARTRATE 25 MG/1
25 TABLET, FILM COATED ORAL 2 TIMES DAILY
Status: DISCONTINUED | OUTPATIENT
Start: 2018-08-13 | End: 2018-08-15 | Stop reason: HOSPADM

## 2018-08-13 RX ORDER — LABETALOL HYDROCHLORIDE 5 MG/ML
INJECTION, SOLUTION INTRAVENOUS AS NEEDED
Status: DISCONTINUED | OUTPATIENT
Start: 2018-08-13 | End: 2018-08-13 | Stop reason: HOSPADM

## 2018-08-13 RX ORDER — ROCURONIUM BROMIDE 10 MG/ML
INJECTION, SOLUTION INTRAVENOUS AS NEEDED
Status: DISCONTINUED | OUTPATIENT
Start: 2018-08-13 | End: 2018-08-13 | Stop reason: HOSPADM

## 2018-08-13 RX ORDER — CEFAZOLIN SODIUM/WATER 2 G/20 ML
2 SYRINGE (ML) INTRAVENOUS EVERY 8 HOURS
Status: COMPLETED | OUTPATIENT
Start: 2018-08-13 | End: 2018-08-14

## 2018-08-13 RX ORDER — ONDANSETRON 2 MG/ML
4 INJECTION INTRAMUSCULAR; INTRAVENOUS AS NEEDED
Status: DISCONTINUED | OUTPATIENT
Start: 2018-08-13 | End: 2018-08-13 | Stop reason: HOSPADM

## 2018-08-13 RX ORDER — LIDOCAINE HYDROCHLORIDE 20 MG/ML
INJECTION, SOLUTION EPIDURAL; INFILTRATION; INTRACAUDAL; PERINEURAL AS NEEDED
Status: DISCONTINUED | OUTPATIENT
Start: 2018-08-13 | End: 2018-08-13 | Stop reason: HOSPADM

## 2018-08-13 RX ORDER — DEXAMETHASONE SODIUM PHOSPHATE 4 MG/ML
INJECTION, SOLUTION INTRA-ARTICULAR; INTRALESIONAL; INTRAMUSCULAR; INTRAVENOUS; SOFT TISSUE AS NEEDED
Status: DISCONTINUED | OUTPATIENT
Start: 2018-08-13 | End: 2018-08-13 | Stop reason: HOSPADM

## 2018-08-13 RX ORDER — HYDROMORPHONE HYDROCHLORIDE 1 MG/ML
.2-.5 INJECTION, SOLUTION INTRAMUSCULAR; INTRAVENOUS; SUBCUTANEOUS
Status: DISCONTINUED | OUTPATIENT
Start: 2018-08-13 | End: 2018-08-13 | Stop reason: HOSPADM

## 2018-08-13 RX ORDER — SODIUM CHLORIDE 0.9 % (FLUSH) 0.9 %
5-10 SYRINGE (ML) INJECTION EVERY 8 HOURS
Status: DISCONTINUED | OUTPATIENT
Start: 2018-08-13 | End: 2018-08-14 | Stop reason: SDUPTHER

## 2018-08-13 RX ORDER — EPHEDRINE SULFATE 50 MG/ML
INJECTION, SOLUTION INTRAVENOUS AS NEEDED
Status: DISCONTINUED | OUTPATIENT
Start: 2018-08-13 | End: 2018-08-13 | Stop reason: HOSPADM

## 2018-08-13 RX ORDER — ONDANSETRON 2 MG/ML
INJECTION INTRAMUSCULAR; INTRAVENOUS AS NEEDED
Status: DISCONTINUED | OUTPATIENT
Start: 2018-08-13 | End: 2018-08-13 | Stop reason: HOSPADM

## 2018-08-13 RX ORDER — SODIUM CHLORIDE, SODIUM LACTATE, POTASSIUM CHLORIDE, CALCIUM CHLORIDE 600; 310; 30; 20 MG/100ML; MG/100ML; MG/100ML; MG/100ML
25 INJECTION, SOLUTION INTRAVENOUS CONTINUOUS
Status: DISCONTINUED | OUTPATIENT
Start: 2018-08-13 | End: 2018-08-13 | Stop reason: HOSPADM

## 2018-08-13 RX ORDER — HEPARIN SODIUM 1000 [USP'U]/ML
INJECTION, SOLUTION INTRAVENOUS; SUBCUTANEOUS AS NEEDED
Status: DISCONTINUED | OUTPATIENT
Start: 2018-08-13 | End: 2018-08-13 | Stop reason: HOSPADM

## 2018-08-13 RX ORDER — METOPROLOL TARTRATE 25 MG/1
12.5 TABLET, FILM COATED ORAL ONCE
Status: COMPLETED | OUTPATIENT
Start: 2018-08-13 | End: 2018-08-13

## 2018-08-13 RX ORDER — MORPHINE SULFATE 10 MG/ML
2 INJECTION, SOLUTION INTRAMUSCULAR; INTRAVENOUS
Status: DISCONTINUED | OUTPATIENT
Start: 2018-08-13 | End: 2018-08-13 | Stop reason: HOSPADM

## 2018-08-13 RX ORDER — ETOMIDATE 2 MG/ML
INJECTION INTRAVENOUS AS NEEDED
Status: DISCONTINUED | OUTPATIENT
Start: 2018-08-13 | End: 2018-08-13 | Stop reason: HOSPADM

## 2018-08-13 RX ORDER — SODIUM CHLORIDE 0.9 % (FLUSH) 0.9 %
5-10 SYRINGE (ML) INJECTION AS NEEDED
Status: DISCONTINUED | OUTPATIENT
Start: 2018-08-13 | End: 2018-08-13 | Stop reason: HOSPADM

## 2018-08-13 RX ORDER — PHENYLEPHRINE HCL IN 0.9% NACL 0.4MG/10ML
SYRINGE (ML) INTRAVENOUS AS NEEDED
Status: DISCONTINUED | OUTPATIENT
Start: 2018-08-13 | End: 2018-08-13 | Stop reason: HOSPADM

## 2018-08-13 RX ORDER — DIPHENHYDRAMINE HYDROCHLORIDE 50 MG/ML
12.5 INJECTION, SOLUTION INTRAMUSCULAR; INTRAVENOUS AS NEEDED
Status: DISCONTINUED | OUTPATIENT
Start: 2018-08-13 | End: 2018-08-13 | Stop reason: HOSPADM

## 2018-08-13 RX ADMIN — SODIUM CHLORIDE 15 MG/HR: 900 INJECTION, SOLUTION INTRAVENOUS at 19:03

## 2018-08-13 RX ADMIN — SODIUM CHLORIDE, POTASSIUM CHLORIDE, SODIUM LACTATE AND CALCIUM CHLORIDE: 600; 310; 30; 20 INJECTION, SOLUTION INTRAVENOUS at 07:00

## 2018-08-13 RX ADMIN — ROCURONIUM BROMIDE 10 MG: 10 INJECTION, SOLUTION INTRAVENOUS at 08:52

## 2018-08-13 RX ADMIN — FENTANYL CITRATE 100 MCG: 50 INJECTION, SOLUTION INTRAMUSCULAR; INTRAVENOUS at 07:38

## 2018-08-13 RX ADMIN — ROCURONIUM BROMIDE 40 MG: 10 INJECTION, SOLUTION INTRAVENOUS at 07:43

## 2018-08-13 RX ADMIN — LABETALOL HYDROCHLORIDE 5 MG: 5 INJECTION, SOLUTION INTRAVENOUS at 09:43

## 2018-08-13 RX ADMIN — DEXAMETHASONE SODIUM PHOSPHATE 8 MG: 4 INJECTION, SOLUTION INTRA-ARTICULAR; INTRALESIONAL; INTRAMUSCULAR; INTRAVENOUS; SOFT TISSUE at 09:17

## 2018-08-13 RX ADMIN — SODIUM CHLORIDE, SODIUM LACTATE, POTASSIUM CHLORIDE, CALCIUM CHLORIDE: 600; 310; 30; 20 INJECTION, SOLUTION INTRAVENOUS at 07:31

## 2018-08-13 RX ADMIN — ETOMIDATE 20 MG: 2 INJECTION INTRAVENOUS at 07:38

## 2018-08-13 RX ADMIN — ONDANSETRON 4 MG: 2 INJECTION INTRAMUSCULAR; INTRAVENOUS at 09:32

## 2018-08-13 RX ADMIN — ROCURONIUM BROMIDE 10 MG: 10 INJECTION, SOLUTION INTRAVENOUS at 07:38

## 2018-08-13 RX ADMIN — PROPOFOL 50 MG: 10 INJECTION, EMULSION INTRAVENOUS at 08:07

## 2018-08-13 RX ADMIN — SODIUM CHLORIDE 15 MG/HR: 900 INJECTION, SOLUTION INTRAVENOUS at 21:05

## 2018-08-13 RX ADMIN — CEFAZOLIN 3 G: 1 INJECTION, POWDER, FOR SOLUTION INTRAMUSCULAR; INTRAVENOUS; PARENTERAL at 07:54

## 2018-08-13 RX ADMIN — LISINOPRIL 5 MG: 5 TABLET ORAL at 12:59

## 2018-08-13 RX ADMIN — Medication 10 ML: at 13:47

## 2018-08-13 RX ADMIN — Medication 10 ML: at 05:31

## 2018-08-13 RX ADMIN — SODIUM CHLORIDE 15 MG/HR: 900 INJECTION, SOLUTION INTRAVENOUS at 22:54

## 2018-08-13 RX ADMIN — MORPHINE SULFATE 2 MG: 2 INJECTION, SOLUTION INTRAMUSCULAR; INTRAVENOUS at 16:17

## 2018-08-13 RX ADMIN — SODIUM CHLORIDE 25 ML/HR: 900 INJECTION, SOLUTION INTRAVENOUS at 13:47

## 2018-08-13 RX ADMIN — FENTANYL CITRATE 50 MCG: 50 INJECTION, SOLUTION INTRAMUSCULAR; INTRAVENOUS at 08:08

## 2018-08-13 RX ADMIN — Medication 10 ML: at 22:55

## 2018-08-13 RX ADMIN — EPHEDRINE SULFATE 10 MG: 50 INJECTION, SOLUTION INTRAVENOUS at 08:03

## 2018-08-13 RX ADMIN — PROPOFOL 50 MG: 10 INJECTION, EMULSION INTRAVENOUS at 08:06

## 2018-08-13 RX ADMIN — Medication 40 MCG: at 07:58

## 2018-08-13 RX ADMIN — METOPROLOL TARTRATE 12.5 MG: 25 TABLET ORAL at 21:04

## 2018-08-13 RX ADMIN — FENTANYL CITRATE 25 MCG: 50 INJECTION, SOLUTION INTRAMUSCULAR; INTRAVENOUS at 10:38

## 2018-08-13 RX ADMIN — Medication 1 LOZENGE: at 15:46

## 2018-08-13 RX ADMIN — ATORVASTATIN CALCIUM 40 MG: 40 TABLET, FILM COATED ORAL at 21:05

## 2018-08-13 RX ADMIN — METOPROLOL TARTRATE 12.5 MG: 25 TABLET ORAL at 12:59

## 2018-08-13 RX ADMIN — SODIUM CHLORIDE 15 MG/HR: 900 INJECTION, SOLUTION INTRAVENOUS at 14:54

## 2018-08-13 RX ADMIN — Medication 2 G: at 15:40

## 2018-08-13 RX ADMIN — MORPHINE SULFATE 2 MG: 2 INJECTION, SOLUTION INTRAMUSCULAR; INTRAVENOUS at 12:40

## 2018-08-13 RX ADMIN — HYDRALAZINE HYDROCHLORIDE 10 MG: 20 INJECTION INTRAMUSCULAR; INTRAVENOUS at 15:38

## 2018-08-13 RX ADMIN — FENTANYL CITRATE 50 MCG: 50 INJECTION, SOLUTION INTRAMUSCULAR; INTRAVENOUS at 08:10

## 2018-08-13 RX ADMIN — LIDOCAINE HYDROCHLORIDE 70 MG: 20 INJECTION, SOLUTION EPIDURAL; INFILTRATION; INTRACAUDAL; PERINEURAL at 07:38

## 2018-08-13 RX ADMIN — Medication 40 MCG: at 09:08

## 2018-08-13 RX ADMIN — SUCCINYLCHOLINE CHLORIDE 160 MG: 20 INJECTION INTRAMUSCULAR; INTRAVENOUS at 07:38

## 2018-08-13 RX ADMIN — Medication 40 MCG: at 08:54

## 2018-08-13 RX ADMIN — HYDRALAZINE HYDROCHLORIDE 10 MG: 20 INJECTION INTRAMUSCULAR; INTRAVENOUS at 14:16

## 2018-08-13 RX ADMIN — FENTANYL CITRATE 25 MCG: 50 INJECTION, SOLUTION INTRAMUSCULAR; INTRAVENOUS at 10:11

## 2018-08-13 RX ADMIN — LABETALOL HYDROCHLORIDE 5 MG: 5 INJECTION, SOLUTION INTRAVENOUS at 09:46

## 2018-08-13 RX ADMIN — EPHEDRINE SULFATE 5 MG: 50 INJECTION, SOLUTION INTRAVENOUS at 08:23

## 2018-08-13 RX ADMIN — HEPARIN SODIUM 5000 UNITS: 1000 INJECTION, SOLUTION INTRAVENOUS; SUBCUTANEOUS at 08:17

## 2018-08-13 RX ADMIN — Medication 80 MCG: at 08:51

## 2018-08-13 RX ADMIN — LABETALOL HYDROCHLORIDE 5 MG: 5 INJECTION, SOLUTION INTRAVENOUS at 09:47

## 2018-08-13 RX ADMIN — FENTANYL CITRATE 25 MCG: 50 INJECTION, SOLUTION INTRAMUSCULAR; INTRAVENOUS at 11:08

## 2018-08-13 RX ADMIN — SODIUM CHLORIDE 12 MG/HR: 900 INJECTION, SOLUTION INTRAVENOUS at 12:50

## 2018-08-13 RX ADMIN — LABETALOL HYDROCHLORIDE 10 MG: 5 INJECTION, SOLUTION INTRAVENOUS at 09:55

## 2018-08-13 RX ADMIN — PROPOFOL 50 MG: 10 INJECTION, EMULSION INTRAVENOUS at 07:38

## 2018-08-13 RX ADMIN — LABETALOL HYDROCHLORIDE 10 MG: 5 INJECTION, SOLUTION INTRAVENOUS at 08:47

## 2018-08-13 RX ADMIN — MUPIROCIN: 20 OINTMENT TOPICAL at 18:04

## 2018-08-13 NOTE — PERIOP NOTES
Received pt from floor pt alert and oriented x4. Pt reports has been npo  Consent on chart. Pt states the nicotene patch feel off is not on. Pt had mepelix replaced by nurse as the previous one was curled up on the back  Skin intact. paula - right and left temporal pulse palp. Smile and tongue pertrussion symmetrical right and left radial pulse palp. Brisk cap refill grasp strong and equal.   Right and left dp and pt pulse palp. Strong foot pushes. Pt reports throat is sore. 2/10 . Steri strips in tact  To right side neck. Arterial line attempted in holding area without success by  Dr. Nighat Abreu pt uncomfortable will be done in the back per him . Pt received versed 3 cc. Nc 2 liters applied pt transposed to or with nc 2 liters in place.

## 2018-08-13 NOTE — ADT AUTH CERT NOTES
Utilization Review           Carotid Endarterectomy - Care Day 6 (8/12/2018) by Nakita Mcgrath Status Review Entered       Completed 8/13/2018       Details              Care Day: 6 Care Date: 8/12/2018 Level of Care: ICU       Guideline Day 2        Clinical Status       (X) * Procedure completed       8/13/2018 9:21 AM EDT by Elvirasimba Rodriguez         Doing well from Rt CEA              (X) * Hemodynamic stability       8/13/2018 9:21 AM EDT by Elvirasimba Rodriguez         Vital Signs: 152/68, 98.9, 62, 18, 99% RA              (X) * No new neurologic deficits       8/13/2018 9:21 AM EDT by Elvirasimba Rodriguez         Looks good Neuro intact              (X) * No swallowing difficulty       8/13/2018 9:21 AM EDT by Elvirasimba Rodriguez         no issiues noted              (X) * No evidence of postoperative or surgical site infection       8/13/2018 9:21 AM EDT by Elvirasimba Rodriguez         Vascular   No c/o VSS Looks good              (X) * No bleeding or growing hematoma at operative site       8/13/2018 9:21 AM EDT by Elvirasimba Rodriguez         Vascular Looks good              (X) * No evidence of myocardial ischemia or infarct       8/13/2018 9:21 AM EDT by Elvira Rodriguez         Doing well from Rt CEA              (X) * Pain absent or managed       ( ) * Discharge plans and education understood              Activity       (X) * Ambulatory       8/13/2018 9:21 AM EDT by Elvira Rodriguez         as tolerated                     Routes       (X) * Oral hydration, medications, and diet       8/13/2018 9:21 AM EDT by Susan Chin   no issues will be NPO past MN for Plan left CEA tomorrow                     Interventions       (X) Neurologic checks       8/13/2018 9:21 AM EDT by Elvira Rodriguez         Neuro intact                     Medications       (X) Aspirin       8/13/2018 9:21 AM EDT by Elvira Rodriguez         ASA 81mg qd po              (X) Statin       8/13/2018 9:21 AM EDT by Chan Lomeli Henry         Lipitor 40mg qd po              (X) Antihypertensive medication as needed       8/13/2018 9:21 AM EDT by Jaquelin King         Lopressor 12.5mg bid po              (X) Possible beta-blocker       8/13/2018 9:21 AM EDT by Jaquelin King         Lopressor 12.5mg bid po                     8/13/2018 9:21 AM EDT by Jaquelin King       Subject: Additional Clinical Information       Abn. Findings LABS/RADIOLOGY:         wbc 11.4, rbc 3.94, glucose 128, calcium 8.3         Meds:         Tylenol 650mg q4hrs prn po x2; ASA 81mg qd po; Lipitor 40mg qd po; Questran 4g tid po; Colestid 2g qd po; Lisinopril 5mg qd po; Lopressor 12.5mg bid po; Morphine 2mg q4hrs prn IV; Plavix 75mg qd po;               ã    Vascular Surgery             Progress Notes             Date of Service: 08/12/18 1342                         Vascular         ã         No c/o         VSS         Looks good         Neuro intact         Doing well from Rt CEA         Plan left CEA tomorrow         ã         ã                                                  * Milestone                  Carotid Endarterectomy - Care Day 5 (8/11/2018) by Erica Hargrove        Review Status Review Entered       Completed 8/13/2018       Details              Care Day: 5 Care Date: 8/11/2018 Level of Care: ICU       Guideline Day 1        Level Of Care       (X) OR to recovery       8/13/2018 9:11 AM EDT by Jaquelin King         PROCEDURES PERFORMED:  Right carotid endarterectomy, Dacron patch COMPLICATIONS:  None.                     Clinical Status       (X) * Clinical Indications met [E]       8/13/2018 9:11 AM EDT by Jaquelin King         Duplex Carotid Nav: IMPRESSION:   1.  80-99% stenosis in the right ICA. 2.  80-99% stenosis in the left ICA.  3.  Bilateral antegrade vertebral artery flow.                     Medications       (X) Aspirin postoperatively       8/13/2018 9:11 AM EDT by Jaquelin King         ASA 81mg qd po            (X) Statin       8/13/2018 9:11 AM EDT by Leighann Cullen         Lipitor 40mg qd po              (X) Antihypertensive medication as needed       8/13/2018 9:11 AM EDT by Prasad Meyer in NS 0-150ml/hr titrate IV x3 Lopressor 12.5mg bid po              (X) Possible beta-blocker       8/13/2018 9:11 AM EDT by Leighann Cullen         Lopressor 12.5mg bid po                                          * Milestone              Additional Notes       Clinical Date Reviewed: 8/11/2018                LOS; Status; Review Type:  INPT/Surgical/CS              Vital Signs: 127/44, 98.9, 69, 19, 96% RA               Abn. Findings LABS/RADIOLOGY:       no labs              Meds:       Tylenol 650mg q4hrs prn po x3; ASA 81mg qd po; Lipitor 40mg qd po; Questran 4g tid po; Colestid 2g qd po; Lisinopril 5mg qd po; Lopressor 12.5mg bid po; Morphine 2mg q4hrs prn IV; Plavix 75mg qd po; Cardene in NS 0-150ml/hr titrate IV x3;               General and Vascular Surgery Op Notes Date of Service: 08/11/18 0456              DATE OF SERVICE: 08/10/2018               PREOPERATIVE DIAGNOSIS:  Symptomatic high-grade right carotid stenosis.               POSTOPERATIVE DIAGNOSIS:  Symptomatic high-grade right carotid stenosis.                PROCEDURES PERFORMED:  Right carotid endarterectomy, Dacron patch               SURGEON: Tatiana Shetty MD               ANESTHESIA:  General.               ESTIMATED BLOOD LOSS:  minimal               SPECIMENS REMOVED:   plaque               COMPLICATIONS:  None.                        ANESTHESIA:  General.               INDICATIONS:  The patient is a 68-year-old gentleman who is from [place] but is working transiently in the [facility] when he developed right hemispheric neurologic symptoms and sought medical care. Bridger Eubanks was noted to have small punctate acute infarcts throughout the right hemisphere and a very high-grade right carotid stenosis with loose debris and an ulcerative plaque.  As he was completely neurologically intact and all of the hemispheric lesions were tiny, decision was made in conjunction with his referring neurologist to proceed with acute endarterectomy.               PROCEDURE:  Following obtaining informed consent, the patient placed supine on the operating table.  After adequate induction of general anesthesia, site and patient confirmation, confirmation of prophylactic antibiotics, the right neck was prepped and draped in the usual sterile fashion.  An incision was made along the anterior border of the sternocleidomastoid, deepened to and through the platysma.  Crossing facial veins were doubly ligated and divided.  The common carotid was identified and controlled at the level of the omohyoid.  Dissection was continued cephalad until the bifurcation was identified, dissected free and controlled.  Tenth and twelfth cranial nerves were both identified and care taken to avoid their injury.  The internal, external thyroid branch and common carotids were all controlled.  The patient was systemically heparinized.  Arteriotomy was begun in the common carotid and extended through the area of very heavy disease at the bulb and extending several centimeters onto the internal carotid.  Ultimately, a disease free area in the internal carotid was achieved. Odean Fuelling shunt was placed, held in position with Naga shunt clamps.  Endarterectomy was begun on the common carotid and extended cephalad to occlusion with eversion endarterectomy of the external carotid and finally a directly visualized  endarterectomy of the internal carotid with a satisfactory endpoint.  The endarterectomized segment was meticulously inspected for loose debris.  The arteriotomy was closed with a Dacron patch using 2 continuous Prolene sutures.  Just prior to completion of the patch angioplasty Naga shunt was removed.  The endarterectomized segment was again irrigated and evacuated and the patch arteriotomy completed.  Flow was initially restored at the external carotid and then ultimately at the internal carotid vessel.  Patient tolerated the procedure well.  Flow was verified with a handheld Doppler.  The wound was deemed hemostatic and closed in 2 layers over a closed suction drain.  Skin was closed with running subcuticular stitch.  The patient tolerated the procedure well.  No complications.               Maria Dolores Mark MD                Vascular Surgery Progress Notes Date of Service: 08/11/18 1012              Vascular               Looks great       Neuro intact       To floor       OOB       PT/OT       Left CEA Monday              Pulmonary Disease Progress Notes Date of Service: 08/11/18 1209              Patient seen and evaluated, chart reviewed        56 yo male s/p right CEA       Now pt in CCU in no distress, no acute complaints       Good spirits        Denies speech or motor abnormalities       Denies S/S CAMERON despite at risk body habitus       ROS: expected post op pain              A/P:       - s/p R CEA: post op care in ccu overnight       - BP control       - pain control       - advance diet as tolerated       - mobilize       - Will assist on disposition planning when stable for transfer

## 2018-08-13 NOTE — ANESTHESIA PREPROCEDURE EVALUATION
Anesthetic History   No history of anesthetic complications            Review of Systems / Medical History  Patient summary reviewed, nursing notes reviewed and pertinent labs reviewed    Pulmonary          Undiagnosed apnea and smoker         Neuro/Psych       CVA  TIA and psychiatric history    Comments: Bilateral carpal tunnel syndrome       Transient ischemic attack involving right internal carotid artery      Bilateral carotid artery stenosis    Cardiovascular    Hypertension          PAD    Exercise tolerance: >4 METS     GI/Hepatic/Renal  Within defined limits              Endo/Other        Obesity     Other Findings            Physical Exam    Airway  Mallampati: III  TM Distance: 4 - 6 cm  Neck ROM: normal range of motion, short neck   Mouth opening: Normal     Cardiovascular  Regular rate and rhythm,  S1 and S2 normal,  no murmur, click, rub, or gallop             Dental  No notable dental hx       Pulmonary  Breath sounds clear to auscultation               Abdominal  GI exam deferred       Other Findings            Anesthetic Plan    ASA: 3  Anesthesia type: general    Monitoring Plan: Arterial line        Anesthetic plan and risks discussed with: Patient

## 2018-08-13 NOTE — PERIOP NOTES
Handoff Report from Operating Room to PACU    Report received from Bianca Randolph RN and Zaheer Velazquez CRNA regarding Brenda Bess. Surgeon(s):  Samreen Felix MD  And Procedure(s) (LRB):  LEFT CAROTID ARTERY ENDARTERECTOMY (Left)  confirmed   with drains and dressings discussed. Anesthesia type, drugs, patient history, complications, estimated blood loss, vital signs, intake and output, and last pain medication, lines, reversal medications and temperature were reviewed.

## 2018-08-13 NOTE — PROGRESS NOTES
8/13/2018    INTENSIVIST PROGRESS NOTE:     Patient seen and evaluated, chart reviewed   56 yo male s/p right CEA    Now pt in CCU following left CEA in no distress, no acute complaints    Groggy but arousable, cooperative; no speech or motor abnormalities    Denies S/S CAMERON despite at risk body habitus    ROS: expected post op pain;no SOB.  No nausea    IV nicardipine    Visit Vitals    /53    Pulse 73    Temp 98.2 °F (36.8 °C)    Resp 13    Ht 6' (1.829 m)    Wt 113.4 kg (250 lb)    SpO2 96%    BMI 33.91 kg/m2       General: no distress  male  Eyes: anicteric  HEENT: dry oral mucosa, tongue midline  Neck: FROM, soft, left dressing intact, ANGELICA drain  CV: RRR  Lungs: clear  Abd: soft  : no flank pain  Ext: no edema  Skin: no rashes  Musculoskeletal: normal inspection  Neuro: non focal    No labs today;     A/P:    - Left carotid Stenosis- s/p left CEA with Dacron patch- oozing into now 3rd ANGELICA bulb  - s/p R CEA: post op care in ccu overnight  - BP control- on IV nicardipine  - pain control  - advance diet as tolerated    - CBC in AM  - mobilize  - Will assist on disposition planning when stable for transfer    Viktoria Hernandez MD

## 2018-08-13 NOTE — OP NOTES
[address]  OPERATIVE REPORT    Srikanth Baer  MR#: [id number]  : 1955  ACCOUNT #: [de-identified]   DATE OF SERVICE: 2018    PREOPERATIVE DIAGNOSIS:  High-grade asymptomatic left carotid stenosis. POSTOPERATIVE DIAGNOSIS:  High-grade asymptomatic left carotid stenosis. PROCEDURE PERFORMED:  Left carotid endarterectomy with Dacron patch. SURGEON:  Lefty Guillory MD     ANESTHESIA:  General.    ESTIMATED BLOOD LOSS:  100cc    SPECIMENS REMOVED:  plaque    COMPLICATIONS:  none      INDICATIONS:  The patient is a 42-year-old gentleman with very high-grade ulcerated bilateral carotid lesions. The right side was symptomatic and was treated 72 hours ago. He tolerated the right carotid endarterectomy without incident. He had no hemodynamic issues, headaches or other problems postoperatively. He noted no issues with phonation, voice or swallowing, and is now returned to the operating room for left carotid endarterectomy. PROCEDURE:  After obtaining informed consent, patient was placed supine on the operating table. After adequate induction of general anesthesia in which the patient's cords were noted to be in the midline, site and patient confirmation, administration of prophylactic antibiotics, the left neck was prepped and draped in sterile fashion. An incision was made along the anterior border of the sternocleidomastoid and deepened to and through the platysma. Crossing facial veins were doubly ligated and divided. The common carotid was identified and controlled at the level of the omohyoid. It was immediately noted that the tenth cranial or vagus nerve was located anteriorly on the carotid and crossed over the bifurcation. This required extensive mobilization along the course of the field so as to allow adequate access. Dissection was continued cephalad until the thyroid branch and external carotid were dissected free and controlled.   Dissection was carried along the internal carotid. Similar to the other side, the disease extended several centimeters above the bifurcation and the dissection required was fairly extensive. The digastric was divided. The twelfth cranial hypoglossal nerve required complete mobilization. It was protected in a sling created from a Penrose drain and gently retracted superiorly. Dissection was then carried until almost the base of the skull and the internal carotid. The patient was systemically heparinized. The internal, external and common carotids were all occluded. An arteriotomy was begun in the common carotid and extended cephalad through the area of heavy grumous disease and ulcerative plaque onto the internal carotid, which was packed with loose debris and ultimately, just at the extent of the dissection, on to a normal internal carotid. There was inadequate access to allow placement of a shunt and a visualized endarterectomy; therefore, a small profunda clamp was placed at the distal extent of the internal carotid dissection and an endarterectomy undertaken without a shunt in place. This required a semi-blind endarterectomy of the distal most portion of the internal carotid, but this appeared to result in a good tapered or feathered endpoint. The endarterectomized segment was irrigated, meticulously inspected for loose debris and evacuated. A Naga shunt was then placed without difficulty and flow verified with a handheld Doppler. The Naga shunt was left in place during the Dacron patch angioplasty, which was accomplished using 2 continuous Prolene sutures. Just prior to completion of the patch angioplasty, the Naga shunt was removed, patch angioplasty completed and flow initially restored up the external carotid, and then ultimately up the left internal carotid. Flow was verified with a hand-held Doppler.   The wound was irrigated with antibiotic solution, evacuated and deemed hemostatic, closed in 2 layers over a closed suction drain. Skin was closed in running subcuticular stitch. Patient tolerated the procedure well with no complications. He was extubated in the operating room and returned to the recovery room in a stable fashion and neurologically intact, including all cranial nerves.       MD JAMIE Madison / GINGER  D: 08/13/2018 10:24     T: 08/13/2018 16:52  JOB #: [id number]  CC: Cate Zimmer MD

## 2018-08-13 NOTE — ROUTINE PROCESS
sbar in note Pt to holding area  Alert and oriented. Previous right cea done last week dressing of steri strips dit. C/o throat soreness 2/10 . Appears neurologically intact. Pt has been npo. Metoprolol last night.

## 2018-08-13 NOTE — PROGRESS NOTES
Received to 2548 via bed, transferred to CCU bed and monitoring initiated. ANGELICA bulb changed after obtaining VS, full of clots. Patient is alert and oriented, moving all limbs with voice clear. A line high, will titrate Cardene as needed. Pharmacy aware of refill need. [facility]  Dr. Holden Armenta paged due to high output from ANGELICA and numerous clots. Dr. Zainab Madrid in to see patient. [address] no longer draining, had been stripping to keep it from clotting off. Dr. Holden Armenta paged to make aware of change. No changes in Neuro checks noted, po antihypertensives given. 1330  Dr. Holden Armenta in to see patient, discussed I/O and ANGELICA output. To hold off on ASA today. 0  Dr. Zainab Madrid in unit, discussed current peak rate for Cardene and recent doses of antihypertensives given. PRN Hydralazine ordered. 1600  PRN Hydralazine again given, discussed with Dr. Zainab Madrid.  1800  BP remains up, watching TV and laughing with daughter. [address]  Dr. Holden Armenta called to check on BP, additional Metoprolol ordered. 1905  Report given to [facility]. New bag of Cardene hung.

## 2018-08-13 NOTE — PROGRESS NOTES
TRANSFER - IN REPORT:    Verbal report received from Gaye Bernal RN on Crissie Im  being received from [employer]) for routine post - op      Report consisted of patients Situation, Background, Assessment and   Recommendations(SBAR). Information from the following report(s) SBAR, Kardex, OR Summary, Procedure Summary, Intake/Output, MAR and Cardiac Rhythm nsr was reviewed with the receiving nurse. Opportunity for questions and clarification was provided. Assessment completed upon patients arrival to unit and care assumed.

## 2018-08-13 NOTE — PERIOP NOTES
TRANSFER - OUT REPORT:    Verbal report given to Kwame Thorne RN on Eyal Perez  being transferred to [address] [place] [address] 17 25 for routine post - op       Report consisted of patients Situation, Background, Assessment and   Recommendations(SBAR). Information from the following report(s) OR Summary, Procedure Summary, Intake/Output and MAR was reviewed with the receiving nurse. Opportunity for questions and clarification was provided.       Patient transported with:   Monitor  O2 @ 3 liters  Registered Nurse  [facility]

## 2018-08-13 NOTE — BRIEF OP NOTE
BRIEF OPERATIVE NOTE    Date of Procedure: 8/13/2018   Preoperative Diagnosis: LEFTCAROTID STENOSIS  Postoperative Diagnosis: LEFT CAROTID STENOSIS    Procedure(s): LEFT CEA w/dacron patch  Surgeon: Konrad Carrillo MD  Anesthesia: General   Estimated Blood Loss: 100cc  Specimens:   ID Type Source Tests Collected by Time Destination   1 : Left Carotid Plaque Preservative Neck  Musa Alford MD 8/13/2018 6015 Pathology      Findings:  CN X anterior and crossing over bifurcation; high lesion with extensive loose debris                   (similar to right side)   Complications: none  Implants:   Implant Name Type Inv.  Item Serial No.  Lot No. LRB No. Used Action   GRAFT PTCH TW GEL SEAL [id number] -- Zulma Saupe   GRAFT PTCH TW GEL SEAL [id number] -- [employer] [id number] [facility] CARDIOVASCULAR [id number] Left 1 Implanted

## 2018-08-13 NOTE — ANESTHESIA POSTPROCEDURE EVALUATION
Post-Anesthesia Evaluation and Assessment    Patient: Jenelle Christopher MRN: [id number]  SSN: [id number]    YOB: 1955  Age: 57 y.o. Sex: male       Cardiovascular Function/Vital Signs  Visit Vitals    /50    Pulse 68    Temp 36.6 °C (97.8 °F)    Resp 13    Ht 6' (1.829 m)    Wt 113.4 kg (250 lb)    SpO2 95%    BMI 33.91 kg/m2       Patient is status post general anesthesia for Procedure(s):  LEFT CAROTID ARTERY ENDARTERECTOMY. Nausea/Vomiting: None    Postoperative hydration reviewed and adequate. Pain:  Pain Scale 1: Numeric (0 - 10) (08/13/18 1108)  Pain Intensity 1: 6 (08/13/18 1108)   Managed    Neurological Status:   Neuro (WDL): Exceptions to WDL (08/13/18 1009)  Neuro  Neurologic State: Drowsy; Eyes open spontaneously (08/13/18 1009)  Orientation Level: Oriented to person;Oriented to place;Oriented to situation (08/13/18 1009)  Cognition: Follows commands (08/13/18 1009)  Speech: Clear (08/13/18 1009)  Assessment L Pupil: Brisk (08/13/18 0644)  Size L Pupil (mm): 1 (08/13/18 0644)  Assessment R Pupil: Brisk (08/13/18 0644)  Size R Pupil (mm): 1 (08/13/18 0644)  LUE Motor Response: Purposeful (08/13/18 1009)  LLE Motor Response: Purposeful (08/13/18 1009)  RUE Motor Response: Purposeful (08/13/18 1009)  RLE Motor Response: Purposeful (08/13/18 1009)   At baseline    Mental Status and Level of Consciousness: Arousable    Pulmonary Status:   O2 Device: Nasal cannula (08/13/18 1009)   Adequate oxygenation and airway patent    Complications related to anesthesia: None    Post-anesthesia assessment completed.  No concerns    Signed By: Maya Verduzco MD     August 13, 2018

## 2018-08-13 NOTE — PERIOP NOTES
sussy drain continues to clot. Able to evacuate clot by stripping tube often. Dr Heavenly Kay in. Aware. Bulb chged. No new orders at this time.

## 2018-08-14 LAB
ERYTHROCYTE [DISTWIDTH] IN BLOOD BY AUTOMATED COUNT: 12.2 % (ref 11.5–14.5)
GLUCOSE BLD STRIP.AUTO-MCNC: 141 MG/DL (ref 65–100)
HCT VFR BLD AUTO: 32.1 % (ref 36.6–50.3)
HGB BLD-MCNC: 10.7 G/DL (ref 12.1–17)
MCH RBC QN AUTO: 30.6 PG (ref 26–34)
MCHC RBC AUTO-ENTMCNC: 33.3 G/DL (ref 30–36.5)
MCV RBC AUTO: 91.7 FL (ref 80–99)
NRBC # BLD: 0 K/UL (ref 0–0.01)
NRBC BLD-RTO: 0 PER 100 WBC
PLATELET # BLD AUTO: 255 K/UL (ref 150–400)
PMV BLD AUTO: 9.7 FL (ref 8.9–12.9)
RBC # BLD AUTO: 3.5 M/UL (ref 4.1–5.7)
SERVICE CMNT-IMP: ABNORMAL
WBC # BLD AUTO: 13.3 K/UL (ref 4.1–11.1)

## 2018-08-14 PROCEDURE — 74011250637 HC RX REV CODE- 250/637: Performed by: INTERNAL MEDICINE

## 2018-08-14 PROCEDURE — 74011000250 HC RX REV CODE- 250: Performed by: SURGERY

## 2018-08-14 PROCEDURE — 94760 N-INVAS EAR/PLS OXIMETRY 1: CPT

## 2018-08-14 PROCEDURE — G8987 SELF CARE CURRENT STATUS: HCPCS

## 2018-08-14 PROCEDURE — 97165 OT EVAL LOW COMPLEX 30 MIN: CPT

## 2018-08-14 PROCEDURE — 74011250637 HC RX REV CODE- 250/637: Performed by: HOSPITALIST

## 2018-08-14 PROCEDURE — 74011250637 HC RX REV CODE- 250/637: Performed by: SURGERY

## 2018-08-14 PROCEDURE — 36415 COLL VENOUS BLD VENIPUNCTURE: CPT | Performed by: INTERNAL MEDICINE

## 2018-08-14 PROCEDURE — 82962 GLUCOSE BLOOD TEST: CPT

## 2018-08-14 PROCEDURE — 97535 SELF CARE MNGMENT TRAINING: CPT

## 2018-08-14 PROCEDURE — 97164 PT RE-EVAL EST PLAN CARE: CPT

## 2018-08-14 PROCEDURE — 74011250636 HC RX REV CODE- 250/636: Performed by: SURGERY

## 2018-08-14 PROCEDURE — 65270000029 HC RM PRIVATE

## 2018-08-14 PROCEDURE — G8988 SELF CARE GOAL STATUS: HCPCS

## 2018-08-14 PROCEDURE — 74011000258 HC RX REV CODE- 258: Performed by: SURGERY

## 2018-08-14 PROCEDURE — 77010033678 HC OXYGEN DAILY

## 2018-08-14 PROCEDURE — 85027 COMPLETE CBC AUTOMATED: CPT | Performed by: INTERNAL MEDICINE

## 2018-08-14 PROCEDURE — 97116 GAIT TRAINING THERAPY: CPT

## 2018-08-14 RX ORDER — SODIUM CHLORIDE 0.9 % (FLUSH) 0.9 %
5-10 SYRINGE (ML) INJECTION EVERY 8 HOURS
Status: DISCONTINUED | OUTPATIENT
Start: 2018-08-14 | End: 2018-08-15 | Stop reason: HOSPADM

## 2018-08-14 RX ORDER — HYDROCODONE BITARTRATE AND ACETAMINOPHEN 10; 325 MG/1; MG/1
1 TABLET ORAL
Status: DISCONTINUED | OUTPATIENT
Start: 2018-08-14 | End: 2018-08-15 | Stop reason: HOSPADM

## 2018-08-14 RX ORDER — ONDANSETRON 2 MG/ML
4 INJECTION INTRAMUSCULAR; INTRAVENOUS
Status: DISCONTINUED | OUTPATIENT
Start: 2018-08-14 | End: 2018-08-15 | Stop reason: HOSPADM

## 2018-08-14 RX ORDER — DOCUSATE SODIUM 100 MG/1
100 CAPSULE, LIQUID FILLED ORAL 2 TIMES DAILY
Status: DISCONTINUED | OUTPATIENT
Start: 2018-08-14 | End: 2018-08-14

## 2018-08-14 RX ORDER — IBUPROFEN 200 MG
1 TABLET ORAL EVERY 24 HOURS
Status: DISCONTINUED | OUTPATIENT
Start: 2018-08-14 | End: 2018-08-15 | Stop reason: HOSPADM

## 2018-08-14 RX ORDER — SODIUM CHLORIDE 0.9 % (FLUSH) 0.9 %
5-10 SYRINGE (ML) INJECTION AS NEEDED
Status: DISCONTINUED | OUTPATIENT
Start: 2018-08-14 | End: 2018-08-15 | Stop reason: HOSPADM

## 2018-08-14 RX ORDER — GUAIFENESIN 100 MG/5ML
325 LIQUID (ML) ORAL DAILY
Status: DISCONTINUED | OUTPATIENT
Start: 2018-08-14 | End: 2018-08-15 | Stop reason: HOSPADM

## 2018-08-14 RX ADMIN — METOPROLOL TARTRATE 25 MG: 25 TABLET ORAL at 21:47

## 2018-08-14 RX ADMIN — SODIUM CHLORIDE 2.5 MG/HR: 900 INJECTION, SOLUTION INTRAVENOUS at 03:00

## 2018-08-14 RX ADMIN — Medication 10 ML: at 05:47

## 2018-08-14 RX ADMIN — CHOLESTYRAMINE 4 G: 4 POWDER, FOR SUSPENSION ORAL at 13:46

## 2018-08-14 RX ADMIN — Medication 10 ML: at 21:46

## 2018-08-14 RX ADMIN — Medication 10 ML: at 05:46

## 2018-08-14 RX ADMIN — DOCUSATE SODIUM 100 MG: 100 CAPSULE, LIQUID FILLED ORAL at 09:17

## 2018-08-14 RX ADMIN — Medication 2 G: at 02:15

## 2018-08-14 RX ADMIN — Medication 10 ML: at 13:46

## 2018-08-14 RX ADMIN — ASPIRIN 81 MG 324 MG: 81 TABLET ORAL at 09:16

## 2018-08-14 RX ADMIN — CHOLESTYRAMINE 4 G: 4 POWDER, FOR SUSPENSION ORAL at 17:57

## 2018-08-14 RX ADMIN — DOCUSATE SODIUM 100 MG: 100 CAPSULE, LIQUID FILLED ORAL at 17:56

## 2018-08-14 RX ADMIN — ATORVASTATIN CALCIUM 40 MG: 40 TABLET, FILM COATED ORAL at 21:47

## 2018-08-14 RX ADMIN — METOPROLOL TARTRATE 25 MG: 25 TABLET ORAL at 02:49

## 2018-08-14 RX ADMIN — CHOLESTYRAMINE 4 G: 4 POWDER, FOR SUSPENSION ORAL at 09:13

## 2018-08-14 RX ADMIN — MUPIROCIN: 20 OINTMENT TOPICAL at 09:58

## 2018-08-14 RX ADMIN — Medication 10 ML: at 02:41

## 2018-08-14 RX ADMIN — LISINOPRIL 10 MG: 5 TABLET ORAL at 09:16

## 2018-08-14 NOTE — PROGRESS NOTES
Problem: Mobility Impaired (Adult and Pediatric)  Goal: *Acute Goals and Plan of Care (Insert Text)  Physical Therapy Goals  Initiated 8/14/2018  1. Patient will move from supine to sit and sit to supine  in bed with independence within 7 day(s). 2.  Patient will transfer from bed to chair and chair to bed with modified independence using the least restrictive device within 7 day(s). 3.  Patient will perform sit to stand with modified independence within 7 day(s). 4.  Patient will ambulate with modified independence for 500 feet with the least restrictive device within 7 day(s). 5.  Patient will ascend/descend 3 stairs with single handrail(s) with supervision/set-up within 7 day(s). physical Therapy REEVALUATION  Patient: Duarte Smith (84 y.o. male)  Date: 8/14/2018  Primary Diagnosis: Stroke (cerebrum) (HCC)  CAROTID STENOSIS  CAROTID STENOSIS  Carotid artery stenosis with cerebral infarction (HCC)  Procedure(s) (LRB):  LEFT CAROTID ARTERY ENDARTERECTOMY (Left) 1 Day Post-Op   Precautions:      Chart, physical therapy assessment, plan of care and goals were reviewed. ASSESSMENT :  Based on the objective data described below, the patient presents with decreased tolerance to activity, impaired balance s/p L carotid endarterectomy. Patient received supine in bed and agreeable to therapy. Pt reported being independent and active prior to this admission. Pt plans to stay with his friend upon discharge. Pt completed supine to sit with min assist and additional time and effort to assume sitting EOB. Pt performed sit<>stand with min A/CGA demonstrating initial unsteadiness, but able to obtain balance. Pt ambulated in the hallway with CGA demonstrating wide JENNI, decreased diamante, lateral trunk sway, but no overt LOB. VSS on room air, however noted increased work of breathing. Pt returned to seated in wheelchair for transport out of CCU.  Patient will continue to benefit from PT to progress mobility as tolerated, further education on pacing and activity limitations. Anticipate 1-2 more sessions during acute hospital stay. Anticipate no PT needs upon discharge. .    Patient will benefit from skilled intervention to address the above impairments. Patients rehabilitation potential is considered to be Good  Factors which may influence rehabilitation potential include:   []           None noted  []           Mental ability/status  [x]           Medical condition  []           Home/family situation and support systems  []           Safety awareness  []           Pain tolerance/management  []           Other:      PLAN :  Recommendations and Planned Interventions:  [x]             Bed Mobility Training             [x]      Neuromuscular Re-Education  [x]             Transfer Training                   []      Orthotic/Prosthetic Training  [x]             Gait Training                         []      Modalities  [x]             Therapeutic Exercises           []      Edema Management/Control  [x]             Therapeutic Activities            [x]      Patient and Family Training/Education  []             Other (comment):  Frequency/Duration: Patient will be followed by physical therapy 5 times a week to address goals. Discharge Recommendations: None  Further Equipment Recommendations for Discharge: none     SUBJECTIVE:   Patient stated I usually get up and go.     OBJECTIVE DATA SUMMARY:     Past Medical History:   Diagnosis Date    Bilateral carotid artery stenosis     880-99%    Stroke (cerebrum) ([facility] [address] 08/02/2018    multiple embolic stroke on right coronal radiata and right parietal     Past Surgical History:   Procedure Laterality Date   [address] course since last seen and reason for reevaluation: s/p L CEA  Critical Behavior:  Neurologic State: Alert  Orientation Level: Oriented X4  Cognition: Follows commands, Appropriate decision making, Appropriate for age attention/concentration, Appropriate safety awareness, Recognition of people/places     Skin:    Strength:    Strength: Generally decreased, functional                      Tone & Sensation:                                  Range Of Motion:  AROM: Within functional limits                       Coordination:       Functional Mobility:  Bed Mobility:     Supine to Sit: Minimum assistance        Transfers:  Sit to Stand: Minimum assistance  Stand to Sit: Contact guard assistance                    Balance:   Sitting: Intact  Standing: Intact  Ambulation/Gait Training:  Distance (ft): 150 Feet (ft)  Assistive Device: Gait belt  Ambulation - Level of Assistance: Contact guard assistance        Gait Abnormalities: Decreased step clearance;Trunk sway increased        Base of Support: Widened     Speed/Colleen: Pace decreased (<100 feet/min)  Step Length: Right shortened;Left shortened       Pain:  Pain Scale 1: Numeric (0 - 10)  Pain Intensity 1: 0              Activity Tolerance:   Good. VSS  Please refer to the flowsheet for vital signs taken during this treatment. After treatment:   [x]  Patient left in no apparent distress sitting up in chair  []  Patient left in no apparent distress in bed  []  Call bell left within reach  [x]  Nursing notified  []  Caregiver present  []  Bed alarm activated    COMMUNICATION/EDUCATION:   The patients plan of care was discussed with: Occupational Therapist and Registered Nurse. [x]  Fall prevention education was provided and the patient/caregiver indicated understanding. [x]  Patient/family have participated as able in goal setting and plan of care. [x]  Patient/family agree to work toward stated goals and plan of care. []  Patient understands intent and goals of therapy, but is neutral about his/her participation. []  Patient is unable to participate in goal setting and plan of care.     Thank you for this referral.  Lucero Vizcaino, PT, DPT   Time Calculation: 19 mins

## 2018-08-14 NOTE — INTERDISCIPLINARY ROUNDS
Interdisciplinary team rounds were held 8/14/18 with the following team members:Care Management, Diabetes Treatment Specialist, Nursing, Nutrition, Pharmacy, Physical Therapy, Physician, Respiratory Therapy and Clinical Coordinator. Plan of care discussed. Goal: See MD orders and progress notes for further  interventions and desired outcomes.

## 2018-08-14 NOTE — PROGRESS NOTES
Looks good  Still requiring Cardene  Should wean today with po Rx  Neck flat  Neuro intact  Expected scratchy voice and swallowing    OOB  Adv diet

## 2018-08-14 NOTE — PROGRESS NOTES
0745: Bedside shift change report given to Tha Perdomo RN (oncoming nurse) by Kayley Martin RN (offgoing nurse). Report included the following information SBAR, Kardex, Procedure Summary, Intake/Output, MAR, Cardiac Rhythm Sinus rhythm and Alarm Parameters . Assumed care of patient at this time, reviewed chart, and completed full assessment. Patient resting quietly in bed with bed wheels locked, bed in low position, bed alarm set, side rails up x3, and call bell within reach. Will continue to monitor closely. 1012: TRANSFER - OUT REPORT:    Verbal report given to [address], RN (name) on Kathy Calle  being transferred to Surgical Tele (unit) for routine progression of care       Report consisted of patients Situation, Background, Assessment and   Recommendations(SBAR). Information from the following report(s) SBAR, Kardex, Intake/Output, MAR, Recent Results, Cardiac Rhythm Sinus rhythm and Alarm Parameters  was reviewed with the receiving nurse. Lines:   Peripheral IV 08/07/18 Right Antecubital (Active)   Site Assessment Clean, dry, & intact 8/14/2018  7:45 AM   Phlebitis Assessment 0 8/14/2018  7:45 AM   Infiltration Assessment 0 8/14/2018  7:45 AM   Dressing Status Clean, dry, & intact 8/14/2018  7:45 AM   Dressing Type Transparent;Tape 8/14/2018  7:45 AM   Hub Color/Line Status Green;Capped 8/14/2018  7:45 AM   Action Taken Blood drawn 8/13/2018  8:00 PM   Alcohol Cap Used Yes 8/13/2018  8:00 PM       Peripheral IV 08/10/18 Left Hand (Active)   Site Assessment Clean, dry, & intact 8/14/2018  7:45 AM   Phlebitis Assessment 0 8/14/2018  7:45 AM   Infiltration Assessment 0 8/14/2018  7:45 AM   Dressing Status Clean, dry, & intact 8/14/2018  7:45 AM   Dressing Type Transparent;Tape 8/14/2018  7:45 AM   Hub Color/Line Status Green; Infusing;Patent 8/14/2018  7:45 AM   Action Taken Open ports on tubing capped 8/13/2018  8:00 PM   Alcohol Cap Used Yes 8/13/2018  8:00 PM       Arterial Line 08/13/18 Right Radial artery (Active)   Site Assessment Clean, dry, & intact 8/14/2018  7:45 AM   Dressing Status Clean, dry, & intact 8/14/2018  7:45 AM   Dressing Type Tape;Transparent;Immobilizer 8/14/2018  7:45 AM   Line Status Intact and in place 8/14/2018  7:45 AM   Treatment Arm board on;Zeroed or re-zeroed 8/14/2018  7:45 AM   Affected Extremity/Extremities Color distal to insertion site pink (or appropriate for race); Pulses palpable;Range of motion performed 8/14/2018  7:45 AM        Opportunity for questions and clarification was provided.       Patient transported with:   Monitor  Patient-specific medications from Pharmacy  Registered Nurse  Tech

## 2018-08-14 NOTE — ROUTINE PROCESS
Bedside shift change report given to Anna (oncoming nurse) by Tee Rene (offgoing nurse).  Report included the following information SBAR, Kardex, Procedure Summary, Intake/Output, MAR, Accordion and Recent Results.

## 2018-08-14 NOTE — PROGRESS NOTES
8/14/2018    INTENSIVIST PROGRESS NOTE:     Patient seen and evaluated, chart reviewed   58 yo male s/p right CEA    Now pt in CCU following left CEA in no distress, no acute complaints    Groggy but arousable, cooperative; no speech or motor abnormalities    Denies S/S CAMERON despite at risk body habitus    ROS:   scratchy voice and swallowing; expected post op pain;no SOB.  No nausea    IV nicardipine off    Visit Vitals    BP (!) 143/38    Pulse 77    Temp 97.8 °F (36.6 °C)    Resp 14    Ht 6' (1.829 m)    Wt 113.4 kg (250 lb)    SpO2 96%    BMI 33.91 kg/m2       General: no distress  male  Eyes: anicteric  HEENT: dry oral mucosa, tongue midline  Neck: FROM, soft, left dressing intact, ANGELICA drain  CV: RRR  Lungs: clear  Abd: soft  : no flank pain  Ext: no edema  Skin: no rashes  Musculoskeletal: normal inspection  Neuro: non focal    Lab Results   Component Value Date/Time    WBC 13.3 (H) 08/14/2018 02:59 AM    HGB 10.7 (L) 08/14/2018 02:59 AM    HCT 32.1 (L) 08/14/2018 02:59 AM    PLATELET 747 71/98/8966 02:59 AM    MCV 91.7 08/14/2018 02:59 AM        A/P:    - Left carotid Stenosis- s/p left CEA with Dacron patch- oozing into now 3rd ANGELICA bulb  - s/p R CEA: post op care in ccu overnight  - Anemia- explained to pt- may make him sluggish but should recover  - BP control- off IV nicardipine but getting prn IV hydralazine- home meds essentially doubled  - pain control  - advance diet as tolerated- nutrition consult  - CBC in AM  - mobilize  - Will assist on disposition planning when stable for transfer    Gracie Vargas MD

## 2018-08-14 NOTE — PROGRESS NOTES
Problem: Self Care Deficits Care Plan (Adult)  Goal: *Acute Goals and Plan of Care (Insert Text)  Occupational Therapy Goals  Initiated 8/14/2018  1. Patient will perform bathing with modified independence within 7 day(s). 2.  Patient will perform lower body dressing with modified independence within 7 day(s). 3.  Patient will be able to transfer to toilet ,and toilet self with mod I , with in 7 days   Occupational Therapy EVALUATION  Patient: Shirley Alva (41 y.o. male)  Date: 8/14/2018  Primary Diagnosis: Stroke (cerebrum) ([facility] [address]  CAROTID STENOSIS  CAROTID STENOSIS  Carotid artery stenosis with cerebral infarction ([facility] [address]  Procedure(s) (LRB):  LEFT CAROTID ARTERY ENDARTERECTOMY (Left) 1 Day Post-Op   Precautions:        ASSESSMENT :  Based on the objective data described below, the patient presents with generalized weakness, decreased endurance, strength, functional mobility and ADLs. Pt was living with friends piror and was independent  In all areas and working. Pt was cleared to be seen for therapy and all VSS and pt was supine in bed and was ready to be transferred to another unit. Pt was SBA for bed mobility, and able to sit on EOB with no balance problems. Pt has good range and strength in BUE. Pt stood with SBA and was able to walk around the room and stand at the sink and complete his grooming with set up and no LOB. Pt was put in wheelchair at the end of session and nursing taking pt to new room. Recommend that pt return home with friends and no further therapy will be needed at discharge. OT to see pt one more visit to work on endurance and ADLs. .    Patient will benefit from skilled intervention to address the above impairments.   Patients rehabilitation potential is considered to be Good  Factors which may influence rehabilitation potential include:   [x]             None noted  []             Mental ability/status  []             Medical condition  []             Home/family situation and support systems  []             Safety awareness  []             Pain tolerance/management  []             Other:      PLAN :  Recommendations and Planned Interventions:  [x]               Self Care Training                  []        Therapeutic Activities  [x]               Functional Mobility Training    []        Cognitive Retraining  [x]               Therapeutic Exercises           [x]        Endurance Activities  []               Balance Training                   []        Neuromuscular Re-Education  []               Visual/Perceptual Training     [x]   Home Safety Training  [x]               Patient Education                 [x]        Family Training/Education  []               Other (comment):    Frequency/Duration: Patient will be followed by occupational therapy 1 time a week to address goals. Discharge Recommendations: None  Further Equipment Recommendations for Discharge: none     SUBJECTIVE:   Patient stated Yanique West will be living with my friend, she is my brittni.     OBJECTIVE DATA SUMMARY:   HISTORY:   Past Medical History:   Diagnosis Date    Bilateral carotid artery stenosis     880-99%    Stroke (cerebrum) ([facility] [address] 08/02/2018    multiple embolic stroke on right coronal radiata and right parietal     Past Surgical History:   Procedure Laterality Date    HX CHOLECYSTECTOMY         Prior Level of Function/Environment/Context: pt lives with a friend and was independent in all areas, working prior.   Expanded or extensive additional review of patient history:     Home Situation  Home Environment: Private residence  # Steps to Enter: 3  Rails to Enter: Yes  Hand Rails : Bilateral  One/Two Story Residence: One story  Living Alone: No  Support Systems: Friends \ neighbors  Patient Expects to be Discharged to[de-identified] Unknown  Current DME Used/Available at Home: Grab bars  Tub or Shower Type: Tub/Shower combination    Hand dominance: Right    EXAMINATION OF PERFORMANCE DEFICITS:  Cognitive/Behavioral Status:  Neurologic State: Alert  Orientation Level: Appropriate for age;Oriented X4  Cognition: Appropriate decision making; Follows commands  Perception: Appears intact  Perseveration: No perseveration noted  Safety/Judgement: Awareness of environment    Skin: in good health     Edema: none noted    Hearing: Auditory  Auditory Impairment: None    Vision/Perceptual:                    intact                 Range of Motion:    AROM: Within functional limits  PROM: Within functional limits                      Strength:    Strength: Generally decreased, functional                Coordination:  Coordination: Within functional limits  Fine Motor Skills-Upper: Left Intact; Right Intact    Gross Motor Skills-Upper: Left Intact; Right Intact    Tone & Sensation:       Sensation: Intact                      Balance:  Sitting: Intact  Standing: Intact    Functional Mobility and Transfers for ADLs:  Bed Mobility:  Supine to Sit: Minimum assistance    Transfers:  Sit to Stand: Minimum assistance  Stand to Sit: Contact guard assistance    ADL Assessment:     Pt is setup for grooming and able to imelda his socks sitting on EOB. Pt is min assist with ADLs at the sink          Cognitive Retraining  Safety/Judgement: Awareness of environment         Functional Measure:  Barthel Index:    Bathin  Bladder: 10  Bowels: 10  Groomin  Dressin  Feeding: 10  Mobility: 10  Stairs: 0  Toilet Use: 5  Transfer (Bed to Chair and Back): 10  Total: 65       Barthel and G-code impairment scale:  Percentage of impairment CH  0% CI  1-19% CJ  20-39% CK  40-59% CL  60-79% CM  80-99% CN  100%   Barthel Score 0-100 100 99-80 79-60 59-40 20-39 1-19   0   Barthel Score 0-20 20 17-19 13-16 9-12 5-8 1-4 0      The Barthel ADL Index: Guidelines  1. The index should be used as a record of what a patient does, not as a record of what a patient could do.   2. The main aim is to establish degree of independence from any help, physical or verbal, however minor and for whatever reason. 3. The need for supervision renders the patient not independent. 4. A patient's performance should be established using the best available evidence. Asking the patient, friends/relatives and nurses are the usual sources, but direct observation and common sense are also important. However direct testing is not needed. 5. Usually the patient's performance over the preceding 24-48 hours is important, but occasionally longer periods will be relevant. 6. Middle categories imply that the patient supplies over 50 per cent of the effort. 7. Use of aids to be independent is allowed. Candido Lakhani., Barthel, D.W. (8681). Functional evaluation: the Barthel Index. [facility] (14)2. Linda Barriga elaina ELISEO Huffman, José Oleary., Melisa Waldron., Minnie Lee, [address] [place] [address] (1999). Measuring the change indisability after inpatient rehabilitation; comparison of the responsiveness of the Barthel Index and Functional Grady Measure. Journal of Neurology, Neurosurgery, and Psychiatry, 66(4), 457-953. Farzana Johnson, N.J.A, CHRISTIAN Mon, & Lili Gutierres MGuyA. (2004.) Assessment of post-stroke quality of life in cost-effectiveness studies: The usefulness of the Barthel Index and the EuroQoL-5D. Quality of Life Research, 13, 940-83         G codes: In compliance with CMSs Claims Based Outcome Reporting, the following G-code set was chosen for this patient based on their primary functional limitation being treated: The outcome measure chosen to determine the severity of the functional limitation was the Barthel with a score of 65/100 which was correlated with the impairment scale. ?  Self Care:     - CURRENT STATUS: CJ - 20%-39% impaired, limited or restricted    - GOAL STATUS: CI - 1%-19% impaired, limited or restricted    - D/C STATUS:  ---------------To be determined---------------     Occupational Therapy Evaluation Charge Determination   History Examination Decision-Making   LOW Complexity : Brief history review  LOW Complexity : 1-3 performance deficits relating to physical, cognitive , or psychosocial skils that result in activity limitations and / or participation restrictions  LOW Complexity : No comorbidities that affect functional and no verbal or physical assistance needed to complete eval tasks       Based on the above components, the patient evaluation is determined to be of the following complexity level: LOW   Pain:  Pain Scale 1: Numeric (0 - 10)  Pain Intensity 1: 0              Activity Tolerance:   Vss/ good  Please refer to the flowsheet for vital signs taken during this treatment. After treatment:   [x] Patient left in no apparent distress sitting up in chair  [] Patient left in no apparent distress in bed  [x] Call bell left within reach  [x] Nursing notified  [] Caregiver present  [] Bed alarm activated    COMMUNICATION/EDUCATION:   The patients plan of care was discussed with: Physical Therapist and Registered Nurse. [x] Home safety education was provided and the patient/caregiver indicated understanding. [x] Patient/family have participated as able in goal setting and plan of care. [x] Patient/family agree to work toward stated goals and plan of care. [] Patient understands intent and goals of therapy, but is neutral about his/her participation. [] Patient is unable to participate in goal setting and plan of care. This patients plan of care is appropriate for delegation to [facility].     Thank you for this referral.  Veronica James OT  Time Calculation: 19 mins

## 2018-08-15 VITALS
WEIGHT: 250 LBS | SYSTOLIC BLOOD PRESSURE: 146 MMHG | HEIGHT: 72 IN | TEMPERATURE: 98.3 F | RESPIRATION RATE: 18 BRPM | OXYGEN SATURATION: 95 % | HEART RATE: 73 BPM | BODY MASS INDEX: 33.86 KG/M2 | DIASTOLIC BLOOD PRESSURE: 58 MMHG

## 2018-08-15 LAB
GLUCOSE BLD STRIP.AUTO-MCNC: 115 MG/DL (ref 65–100)
GLUCOSE BLD STRIP.AUTO-MCNC: 135 MG/DL (ref 65–100)
SERVICE CMNT-IMP: ABNORMAL
SERVICE CMNT-IMP: ABNORMAL

## 2018-08-15 PROCEDURE — 82962 GLUCOSE BLOOD TEST: CPT

## 2018-08-15 PROCEDURE — 94760 N-INVAS EAR/PLS OXIMETRY 1: CPT

## 2018-08-15 PROCEDURE — 74011250637 HC RX REV CODE- 250/637: Performed by: INTERNAL MEDICINE

## 2018-08-15 PROCEDURE — 74011250637 HC RX REV CODE- 250/637: Performed by: SURGERY

## 2018-08-15 PROCEDURE — 74011250637 HC RX REV CODE- 250/637: Performed by: HOSPITALIST

## 2018-08-15 RX ORDER — HYDROCODONE BITARTRATE AND ACETAMINOPHEN 10; 325 MG/1; MG/1
1 TABLET ORAL
Qty: 20 TAB | Refills: 0 | Status: SHIPPED | OUTPATIENT
Start: 2018-08-15

## 2018-08-15 RX ADMIN — Medication 10 ML: at 05:50

## 2018-08-15 RX ADMIN — ASPIRIN 81 MG 324 MG: 81 TABLET ORAL at 10:25

## 2018-08-15 RX ADMIN — Medication 10 ML: at 05:49

## 2018-08-15 RX ADMIN — COLESTIPOL HYDROCHLORIDE 2 G: 1 TABLET, FILM COATED ORAL at 10:25

## 2018-08-15 RX ADMIN — METOPROLOL TARTRATE 25 MG: 25 TABLET ORAL at 10:25

## 2018-08-15 RX ADMIN — CHOLESTYRAMINE 4 G: 4 POWDER, FOR SUSPENSION ORAL at 12:14

## 2018-08-15 RX ADMIN — DOCUSATE SODIUM 100 MG: 100 CAPSULE, LIQUID FILLED ORAL at 10:25

## 2018-08-15 RX ADMIN — LISINOPRIL 10 MG: 5 TABLET ORAL at 10:25

## 2018-08-15 RX ADMIN — CHOLESTYRAMINE 4 G: 4 POWDER, FOR SUSPENSION ORAL at 10:25

## 2018-08-15 NOTE — PROGRESS NOTES
Pt understands that he will have to schedule an appointment with his PCP in [address] [facility]. Pt will have transport home on today. Pt aware that his nurse will review d/c plans and needs. CM has completed the need of pt at this time.     Neli Reid, MSW CM  [phone]

## 2018-08-15 NOTE — DISCHARGE INSTRUCTIONS
Patient Discharge Instructions    Yuri hCaney / [id number] : 1955    Admitted 2018 Discharged: 8/15/2018     What to do at Home  No driving  May shower friday       Information obtained by :  I understand that if any problems occur once I am at home I am to contact my physician. I understand and acknowledge receipt of the instructions indicated above.                                                                                                                                            R.N.'s Signature                                                                  Date/Time                                                                                                                                              Patient or Representative Signature                                                          Date/Time      Mitch Bush MD

## 2018-08-15 NOTE — PROGRESS NOTES
7am-7pm    Patient has ambulated in hallway twice today. Not complaining of any pain    Tolerating diet but complaining of not having a bowel movement. Will be discharged today    I have reviewed discharge instructions with the patient. The patient verbalized understanding.

## 2018-08-21 NOTE — DISCHARGE SUMMARY
[id number] [facility]  MR#: [id number]  : 1955  ACCOUNT #: [de-identified]   ADMIT DATE: 2018  DISCHARGE DATE: 08/15/2018    ADMITTING DIAGNOSES:  Right hemispheric stroke. PROCEDURES PERFORMED:    1. Right carotid endarterectomy (08/10/2018). 2.  Left carotid endarterectomy (2018). HISTORY OF PRESENT ILLNESS, HOSPITAL COURSE:  The patient is a 47-year-old gentleman working for 6-8 weeks in the area. His home was in [place]. He was brought to the emergency room with evidence of a right hemispheric event and tiny punctate lesions noted on intracranial imaging. He was found to have bilateral very high-grade carotid lesions and as he had no neurologic deficit and only very tiny lesions on imaging, he was advised to have early endarterectomy bilaterally. First, the right then the left. He was offered coordination to return to [place] to have this performed there, but he requested that he have performed in this area. He underwent the right endarterectomy first on 08/10/2018 without incident. Postoperative course was unremarkable. He underwent the left side 72 hours later after ensuring that his vocal cords, swallowing and phonation were normal.  Postoperative course for the second procedure was unremarkable. He was discharged on 08/15/2018 to his temporary location in [place] in the care of a daughter and some other friends. He was asked to return to my office in 2 weeks. No driving, heavy lifting or straining until seen back in the office. DISCHARGE MEDICATIONS:  Discharged on aspirin for antiplatelet therapy.         MD JAMIE Bales/PUSHPA  D: 2018 09:36     T: 2018 09:14  JOB #: [id number]  CC: Yan Lewis MD

## (undated) DEVICE — SUTURE MCRYL SZ 4-0 L27IN ABSRB UD L19MM PS-2 1/2 CIR PRIM Y426H

## (undated) DEVICE — NEEDLE HYPO 18GA L1.5IN PNK S STL HUB POLYPR SHLD REG BVL

## (undated) DEVICE — STERILE POLYISOPRENE POWDER-FREE SURGICAL GLOVES: Brand: PROTEXIS

## (undated) DEVICE — 1/4 IN. X 18 IN. LENGTH: Brand: SILICONE TUBING, PENROSE DRAIN

## (undated) DEVICE — SOLUTION IV 500ML 0.9% SOD CHL FLX CONT

## (undated) DEVICE — SUTURE VCRL SZ 3-0 L27IN ABSRB UD L26MM SH 1/2 CIR J416H

## (undated) DEVICE — (D)STRIP SKN CLSR 0.5X4IN WHT --

## (undated) DEVICE — 3M™ TEGADERM™ TRANSPARENT FILM DRESSING FRAME STYLE, 1624W, 2-3/8 IN X 2-3/4 IN (6 CM X 7 CM), 100/CT 4CT/CASE: Brand: 3M™ TEGADERM™

## (undated) DEVICE — BARD® JAVID™ CAROTID BYPASS SHUNT, 17F - 10F X 27.5CM: Brand: BARD® JAVID

## (undated) DEVICE — DRSG PATCH ANTIMIC 1INX4.0MM -- CONVERT TO ITEM 356053

## (undated) DEVICE — BLADE ASSEMB CLP HAIR FINE --

## (undated) DEVICE — REM POLYHESIVE ADULT PATIENT RETURN ELECTRODE: Brand: VALLEYLAB

## (undated) DEVICE — LOOP VES W13MM THK09MM MINI BLU SIL DISPOSABLE

## (undated) DEVICE — SUTURE ETHLN SZ 4-0 L18IN NONABSORBABLE BLK L19MM PS-2 3/8 1667H

## (undated) DEVICE — SPONGE: SPECIALTY PEANUT XR 100/CS: Brand: MEDICAL ACTION INDUSTRIES

## (undated) DEVICE — (D)PREP SKN CHLRAPRP APPL 26ML -- CONVERT TO ITEM 371833

## (undated) DEVICE — VASCULAR-RICHMOND-LF: Brand: MEDLINE INDUSTRIES, INC.

## (undated) DEVICE — HANDLE LT SNAP ON ULT DURABLE LENS FOR TRUMPF ALC DISPOSABLE

## (undated) DEVICE — NEEDLE HYPO 25GA L5/8IN ORNG HUB S STL LATCH BVL UP

## (undated) DEVICE — SYR 10ML LUER LOK 1/5ML GRAD --

## (undated) DEVICE — PART NUMBER 108260, VTI 8 MHZ DISPOSABLE DOPPLER PROBE, STRAIGHT, BOX: Brand: VTI 8 MHZ DISPOSABLE DOPPLER PROBE, STRAIGHT, BOX

## (undated) DEVICE — MAGNETIC DRAPE: Brand: DEVON

## (undated) DEVICE — MAGNETIC INSTRUMENT PAD 10" X 16"; MEDIUM; DISPOSABLE: Brand: CARDINAL HEALTH

## (undated) DEVICE — AGENT HEMSTAT W4XL4IN OXIDIZED REGENERATED CELOS ABSRB SFT

## (undated) DEVICE — MASTISOL ADHESIVE LIQ 2/3ML

## (undated) DEVICE — SYR IRR BLB 2OZ DISP BLU STRL -- CONVERT TO ITEM 357637

## (undated) DEVICE — COTTON BALLS: Brand: DEROYAL

## (undated) DEVICE — SYRINGE TB 1ML TRNSLUC BRL WHT PLUNG BLK MRK CONVENTIONAL

## (undated) DEVICE — INFECTION CONTROL KIT SYS

## (undated) DEVICE — SUTURE PROL SZ 6-0 L24IN NONABSORBABLE BLU L9.3MM BV-1 VISI 8305H

## (undated) DEVICE — SUTURE PROL SZ 5-0 L24IN NONABSORBABLE BLU RB-2 L13IN 1/2 8554H

## (undated) DEVICE — SUT SLK 2-0SH 30IN BLK --

## (undated) DEVICE — X-RAY SPONGES,16 PLY: Brand: DERMACEA

## (undated) DEVICE — GAUZE SPONGES,12 PLY: Brand: CURITY

## (undated) DEVICE — DRAIN SURG W10MMXL20CM SIL FULL PERF HUBLESS FLAT RADPQ

## (undated) DEVICE — DEVON™ KNEE AND BODY STRAP 60" X 3" (1.5 M X 7.6 CM): Brand: DEVON

## (undated) DEVICE — 3M™ DURAPORE™ SURGICAL TAPE 1538-1, 1 INCH X 10 YARD (2,5CM X 9,1M), 12 ROLLS/BOX: Brand: 3M™ DURAPORE™

## (undated) DEVICE — Z DISCONTINUED USE 2131664 WIPE INSTR W3XL3IN NONLINTING

## (undated) DEVICE — Device

## (undated) DEVICE — SUT PROL 6-0 24IN BV1 DA BLU --